# Patient Record
Sex: FEMALE | Race: BLACK OR AFRICAN AMERICAN | Employment: UNEMPLOYED | URBAN - METROPOLITAN AREA
[De-identification: names, ages, dates, MRNs, and addresses within clinical notes are randomized per-mention and may not be internally consistent; named-entity substitution may affect disease eponyms.]

---

## 2017-07-22 ENCOUNTER — APPOINTMENT (OUTPATIENT)
Dept: OCCUPATIONAL MEDICINE | Age: 37
End: 2017-07-22
Attending: FAMILY MEDICINE

## 2018-07-18 ENCOUNTER — HOSPITAL ENCOUNTER (OUTPATIENT)
Age: 38
Discharge: HOME | End: 2018-07-18
Attending: EMERGENCY MEDICINE
Payer: COMMERCIAL

## 2018-07-18 VITALS
WEIGHT: 220 LBS | OXYGEN SATURATION: 99 % | RESPIRATION RATE: 16 BRPM | DIASTOLIC BLOOD PRESSURE: 74 MMHG | HEART RATE: 85 BPM | TEMPERATURE: 98 F | HEIGHT: 66.5 IN | BODY MASS INDEX: 34.94 KG/M2 | SYSTOLIC BLOOD PRESSURE: 112 MMHG

## 2018-07-18 DIAGNOSIS — M67.912 ROTATOR CUFF DISORDER, LEFT: ICD-10-CM

## 2018-07-18 DIAGNOSIS — M54.32 SCIATICA OF LEFT SIDE: Primary | ICD-10-CM

## 2018-07-18 LAB
POCT BILIRUBIN URINE: NEGATIVE
POCT BLOOD URINE: NEGATIVE
POCT GLUCOSE URINE: NEGATIVE MG/DL
POCT KETONE URINE: NEGATIVE MG/DL
POCT LEUKOCYTE ESTERASE URINE: NEGATIVE
POCT NITRITE URINE: NEGATIVE
POCT PH URINE: 6.5 (ref 5–8)
POCT PROTEIN URINE: NEGATIVE MG/DL
POCT SPECIFIC GRAVITY URINE: 1.03
POCT URINE PREGNANCY: NEGATIVE
POCT UROBILINOGEN URINE: 0.2 MG/DL

## 2018-07-18 PROCEDURE — 81025 URINE PREGNANCY TEST: CPT | Performed by: EMERGENCY MEDICINE

## 2018-07-18 PROCEDURE — 81002 URINALYSIS NONAUTO W/O SCOPE: CPT | Performed by: EMERGENCY MEDICINE

## 2018-07-18 PROCEDURE — 99204 OFFICE O/P NEW MOD 45 MIN: CPT

## 2018-07-18 PROCEDURE — 99213 OFFICE O/P EST LOW 20 MIN: CPT

## 2018-07-18 PROCEDURE — 99214 OFFICE O/P EST MOD 30 MIN: CPT

## 2018-07-18 RX ORDER — METHYLPREDNISOLONE 4 MG/1
TABLET ORAL
Qty: 1 PACKAGE | Refills: 0 | Status: SHIPPED | OUTPATIENT
Start: 2018-07-18 | End: 2018-07-26 | Stop reason: ALTCHOICE

## 2018-07-18 RX ORDER — HYDROCODONE BITARTRATE AND ACETAMINOPHEN 5; 325 MG/1; MG/1
1 TABLET ORAL EVERY 6 HOURS PRN
Qty: 8 TABLET | Refills: 0 | Status: SHIPPED | OUTPATIENT
Start: 2018-07-18 | End: 2018-07-26 | Stop reason: ALTCHOICE

## 2018-07-18 NOTE — ED INITIAL ASSESSMENT (HPI)
Back pain - started today. Described back is pinching buttock,    Took naproxen   At 12 noon . Denies any injury or lifting any heavy object. Pt has h/o back pain.

## 2018-07-18 NOTE — ED PROVIDER NOTES
Patient Seen in: [facility]    History   Patient presents with:  Back Pain (musculoskeletal)    Stated Complaint: Low Back/ Left knee/ankle, Left shoulder pain    HPI    This is a 50-year-old female with multiple complaints.   Pa appearing. SKIN: Normal, warm, and dry. HEENT:  Pupils equally round and reactive to light. Conjuctiva clear. Oropharynx is clear and moist.   Lungs: Clear to auscultation bilaterally with no rales, no retractions, and no wheezing.   HEART:  Regular rat visit in 1 day          Medications Prescribed:  Discharge Medication List as of 7/18/2018  5:54 PM    START taking these medications    methylPREDNISolone (MEDROL) 4 MG Oral Tablet Therapy Pack  Dosepack: take as directed, Normal, Disp-1 Package, R-0    H

## 2018-07-19 ENCOUNTER — OFFICE VISIT (OUTPATIENT)
Dept: INTERNAL MEDICINE CLINIC | Facility: CLINIC | Age: 38
End: 2018-07-19
Payer: COMMERCIAL

## 2018-07-19 ENCOUNTER — LAB ENCOUNTER (OUTPATIENT)
Dept: LAB | Age: 38
End: 2018-07-19
Attending: INTERNAL MEDICINE
Payer: COMMERCIAL

## 2018-07-19 VITALS
BODY MASS INDEX: 37.16 KG/M2 | TEMPERATURE: 99 F | HEIGHT: 66.5 IN | DIASTOLIC BLOOD PRESSURE: 62 MMHG | WEIGHT: 234 LBS | SYSTOLIC BLOOD PRESSURE: 104 MMHG | HEART RATE: 84 BPM

## 2018-07-19 DIAGNOSIS — Z13.0 SCREENING FOR BLOOD DISEASE: ICD-10-CM

## 2018-07-19 DIAGNOSIS — Z13.29 THYROID DISORDER SCREENING: ICD-10-CM

## 2018-07-19 DIAGNOSIS — M70.42 PREPATELLAR BURSITIS OF LEFT KNEE: ICD-10-CM

## 2018-07-19 DIAGNOSIS — M75.82 TENDINITIS OF LEFT ROTATOR CUFF: ICD-10-CM

## 2018-07-19 DIAGNOSIS — M62.838 MUSCLE SPASM: Primary | ICD-10-CM

## 2018-07-19 DIAGNOSIS — E55.9 VITAMIN D DEFICIENCY: ICD-10-CM

## 2018-07-19 DIAGNOSIS — Z13.228 SCREENING FOR METABOLIC DISORDER: ICD-10-CM

## 2018-07-19 DIAGNOSIS — Z13.220 SCREENING FOR LIPID DISORDERS: ICD-10-CM

## 2018-07-19 DIAGNOSIS — Z13.1 SCREENING FOR DIABETES MELLITUS: ICD-10-CM

## 2018-07-19 LAB
ALBUMIN SERPL-MCNC: 3.7 G/DL (ref 3.5–4.8)
ALBUMIN/GLOB SERPL: 0.9 {RATIO} (ref 1–2)
ALP LIVER SERPL-CCNC: 75 U/L (ref 37–98)
ALT SERPL-CCNC: 25 U/L (ref 14–54)
ANION GAP SERPL CALC-SCNC: 8 MMOL/L (ref 0–18)
AST SERPL-CCNC: 18 U/L (ref 15–41)
BASOPHILS # BLD AUTO: 0.03 X10(3) UL (ref 0–0.1)
BASOPHILS NFR BLD AUTO: 0.4 %
BILIRUB SERPL-MCNC: 0.4 MG/DL (ref 0.1–2)
BUN BLD-MCNC: 8 MG/DL (ref 8–20)
BUN/CREAT SERPL: 10.8 (ref 10–20)
CALCIUM BLD-MCNC: 9.6 MG/DL (ref 8.3–10.3)
CHLORIDE SERPL-SCNC: 105 MMOL/L (ref 101–111)
CHOLEST SMN-MCNC: 143 MG/DL (ref ?–200)
CO2 SERPL-SCNC: 28 MMOL/L (ref 22–32)
CREAT BLD-MCNC: 0.74 MG/DL (ref 0.55–1.02)
EOSINOPHIL # BLD AUTO: 0.05 X10(3) UL (ref 0–0.3)
EOSINOPHIL NFR BLD AUTO: 0.7 %
ERYTHROCYTE [DISTWIDTH] IN BLOOD BY AUTOMATED COUNT: 14.4 % (ref 11.5–16)
EST. AVERAGE GLUCOSE BLD GHB EST-MCNC: 117 MG/DL (ref 68–126)
GLOBULIN PLAS-MCNC: 4 G/DL (ref 2.5–3.7)
GLUCOSE BLD-MCNC: 82 MG/DL (ref 70–99)
HBA1C MFR BLD HPLC: 5.7 % (ref ?–5.7)
HCT VFR BLD AUTO: 37.7 % (ref 34–50)
HDLC SERPL-MCNC: 43 MG/DL (ref 45–?)
HDLC SERPL: 3.33 {RATIO} (ref ?–4.44)
HGB BLD-MCNC: 11.6 G/DL (ref 12–16)
IMMATURE GRANULOCYTE COUNT: 0.02 X10(3) UL (ref 0–1)
IMMATURE GRANULOCYTE RATIO %: 0.3 %
LDLC SERPL CALC-MCNC: 84 MG/DL (ref ?–130)
LYMPHOCYTES # BLD AUTO: 1.7 X10(3) UL (ref 0.9–4)
LYMPHOCYTES NFR BLD AUTO: 23.6 %
M PROTEIN MFR SERPL ELPH: 7.7 G/DL (ref 6.1–8.3)
MCH RBC QN AUTO: 25.5 PG (ref 27–33.2)
MCHC RBC AUTO-ENTMCNC: 30.8 G/DL (ref 31–37)
MCV RBC AUTO: 82.9 FL (ref 81–100)
MONOCYTES # BLD AUTO: 0.46 X10(3) UL (ref 0.1–1)
MONOCYTES NFR BLD AUTO: 6.4 %
NEUTROPHIL ABS PRELIM: 4.94 X10 (3) UL (ref 1.3–6.7)
NEUTROPHILS # BLD AUTO: 4.94 X10(3) UL (ref 1.3–6.7)
NEUTROPHILS NFR BLD AUTO: 68.6 %
NONHDLC SERPL-MCNC: 100 MG/DL (ref ?–130)
OSMOLALITY SERPL CALC.SUM OF ELEC: 289 MOSM/KG (ref 275–295)
PLATELET # BLD AUTO: 483 10(3)UL (ref 150–450)
POTASSIUM SERPL-SCNC: 4.2 MMOL/L (ref 3.6–5.1)
RBC # BLD AUTO: 4.55 X10(6)UL (ref 3.8–5.1)
RED CELL DISTRIBUTION WIDTH-SD: 43.2 FL (ref 35.1–46.3)
SODIUM SERPL-SCNC: 141 MMOL/L (ref 136–144)
TRIGL SERPL-MCNC: 79 MG/DL (ref ?–150)
TSI SER-ACNC: 0.66 MIU/ML (ref 0.35–5.5)
VIT D+METAB SERPL-MCNC: 8.4 NG/ML (ref 30–100)
VLDLC SERPL CALC-MCNC: 16 MG/DL (ref 5–40)
WBC # BLD AUTO: 7.2 X10(3) UL (ref 4–13)

## 2018-07-19 PROCEDURE — 82306 VITAMIN D 25 HYDROXY: CPT

## 2018-07-19 PROCEDURE — 80061 LIPID PANEL: CPT

## 2018-07-19 PROCEDURE — 83036 HEMOGLOBIN GLYCOSYLATED A1C: CPT

## 2018-07-19 PROCEDURE — 99204 OFFICE O/P NEW MOD 45 MIN: CPT | Performed by: INTERNAL MEDICINE

## 2018-07-19 PROCEDURE — 85025 COMPLETE CBC W/AUTO DIFF WBC: CPT

## 2018-07-19 PROCEDURE — 80053 COMPREHEN METABOLIC PANEL: CPT

## 2018-07-19 PROCEDURE — 84443 ASSAY THYROID STIM HORMONE: CPT

## 2018-07-19 RX ORDER — CYCLOBENZAPRINE HCL 10 MG
14 TABLET ORAL 2 TIMES DAILY PRN
Qty: 28 TABLET | Refills: 1 | Status: SHIPPED | OUTPATIENT
Start: 2018-07-19 | End: 2018-07-26

## 2018-07-19 NOTE — PROGRESS NOTES
Anais Odonnellry  7/7/1980    Patient presents with:  Pain: LG. Room 12.  Left lower back pain that started yesterday, left shoulder pain that she's had a while and its getting worse, left knee pain on and off for 8 months and left ankle pain for 8 m further evaluation of symptoms, the patient presented to the office today. Review of Systems   No f/c/chest pain or sob. No cough. No abd pain/n/v/d. No ha or dizziness. No numbness, tingling, or weakness. No other complaints today.       Current Outpati Toña Tan is a 41year old female who presents with diffuse myalgias and arthralgias.     1 - Left shoulder rotator cuff tendonopathy  2 - Left cervical muscle spasm  3 - Left lumbar paraspinal muscle spasm  4 - Left knee pre-patellar bursit

## 2018-07-23 ENCOUNTER — TELEPHONE (OUTPATIENT)
Dept: INTERNAL MEDICINE CLINIC | Facility: CLINIC | Age: 38
End: 2018-07-23

## 2018-07-23 DIAGNOSIS — M54.16 LUMBAR RADICULOPATHY: ICD-10-CM

## 2018-07-23 DIAGNOSIS — G95.9 LUMBAR MYELOPATHY (HCC): Primary | ICD-10-CM

## 2018-07-23 DIAGNOSIS — M54.12 CERVICAL RADICULOPATHY: ICD-10-CM

## 2018-07-23 RX ORDER — ERGOCALCIFEROL 1.25 MG/1
50000 CAPSULE ORAL WEEKLY
Qty: 12 CAPSULE | Refills: 0 | Status: SHIPPED | OUTPATIENT
Start: 2018-07-23 | End: 2018-09-21

## 2018-07-23 NOTE — TELEPHONE ENCOUNTER
Patient states she has finished the Medrol Dose Pack and Norco, neither one of the medications worked well for her, states pain would return after 1-2 hours when taking Norco, Flexoril BID is slightly helpful and is taking Ibuprofen, Flexoril makes her juliano

## 2018-07-23 NOTE — TELEPHONE ENCOUNTER
Pt was told to call us today to let us know how she is-she is not better-still in a lot of pain in her back and knee-she is done with steroid and pain meds-not at work due to pain-would like to speak to a nurse to see if she needs to come in here or go [facility]

## 2018-07-24 ENCOUNTER — TELEPHONE (OUTPATIENT)
Dept: INTERNAL MEDICINE CLINIC | Facility: CLINIC | Age: 38
End: 2018-07-24

## 2018-07-24 ENCOUNTER — HOSPITAL ENCOUNTER (OUTPATIENT)
Dept: GENERAL RADIOLOGY | Age: 38
Discharge: HOME | End: 2018-07-24
Attending: INTERNAL MEDICINE
Payer: COMMERCIAL

## 2018-07-24 ENCOUNTER — HOSPITAL ENCOUNTER (OUTPATIENT)
Dept: ULTRASOUND IMAGING | Age: 38
Discharge: HOME | End: 2018-07-24
Attending: INTERNAL MEDICINE
Payer: COMMERCIAL

## 2018-07-24 DIAGNOSIS — M54.16 LUMBAR RADICULOPATHY: ICD-10-CM

## 2018-07-24 DIAGNOSIS — M54.12 CERVICAL RADICULOPATHY: ICD-10-CM

## 2018-07-24 DIAGNOSIS — G95.9 LUMBAR MYELOPATHY (HCC): ICD-10-CM

## 2018-07-24 DIAGNOSIS — R19.00 PELVIC MASS: ICD-10-CM

## 2018-07-24 DIAGNOSIS — R19.00 PELVIC MASS: Primary | ICD-10-CM

## 2018-07-24 PROCEDURE — 76830 TRANSVAGINAL US NON-OB: CPT | Performed by: INTERNAL MEDICINE

## 2018-07-24 PROCEDURE — 72050 X-RAY EXAM NECK SPINE 4/5VWS: CPT | Performed by: INTERNAL MEDICINE

## 2018-07-24 PROCEDURE — 76856 US EXAM PELVIC COMPLETE: CPT | Performed by: INTERNAL MEDICINE

## 2018-07-24 PROCEDURE — 72110 X-RAY EXAM L-2 SPINE 4/>VWS: CPT | Performed by: INTERNAL MEDICINE

## 2018-07-24 NOTE — TELEPHONE ENCOUNTER
Per AD, instructed to call pt to review results. He has tried to reach pt, but unsuccessful. Called and spoke to pt. Reviewed with pt, she verbalized understanding.   Pt stated she would be calling to schedule the U/S.

## 2018-07-24 NOTE — TELEPHONE ENCOUNTER
To on call provider-TB  Spoke with Grant regarding STAT results for Xray lumbar spine and Cspine done today. C-spine Conclusion:   CONCLUSION:  There is reversal of the cervical spine. The foramina are normal.  There is no fracture or subluxation.   Cally Lozada

## 2018-07-26 ENCOUNTER — TELEPHONE (OUTPATIENT)
Dept: INTERNAL MEDICINE CLINIC | Facility: CLINIC | Age: 38
End: 2018-07-26

## 2018-07-26 ENCOUNTER — OFFICE VISIT (OUTPATIENT)
Dept: INTERNAL MEDICINE CLINIC | Facility: CLINIC | Age: 38
End: 2018-07-26
Payer: COMMERCIAL

## 2018-07-26 VITALS
HEIGHT: 66.5 IN | RESPIRATION RATE: 16 BRPM | WEIGHT: 236 LBS | SYSTOLIC BLOOD PRESSURE: 118 MMHG | DIASTOLIC BLOOD PRESSURE: 72 MMHG | BODY MASS INDEX: 37.48 KG/M2 | TEMPERATURE: 98 F | HEART RATE: 78 BPM

## 2018-07-26 DIAGNOSIS — M54.50 LOW BACK PAIN WITH RADIATION: Primary | ICD-10-CM

## 2018-07-26 PROCEDURE — 99213 OFFICE O/P EST LOW 20 MIN: CPT | Performed by: PHYSICIAN ASSISTANT

## 2018-07-26 RX ORDER — TRAMADOL HYDROCHLORIDE 50 MG/1
50 TABLET ORAL EVERY 6 HOURS PRN
Qty: 30 TABLET | Refills: 0 | Status: SHIPPED | OUTPATIENT
Start: 2018-07-26 | End: 2018-08-14

## 2018-07-26 RX ORDER — METHYLPREDNISOLONE 4 MG/1
TABLET ORAL
Qty: 1 KIT | Refills: 0 | Status: SHIPPED | OUTPATIENT
Start: 2018-07-26 | End: 2018-07-31 | Stop reason: ALTCHOICE

## 2018-07-26 NOTE — TELEPHONE ENCOUNTER
Spoke with pt stating still very uncomfortable with low back pain w/ sciatica. Norco given at hospital not helping. Completed medrol pack with no improvements. still very uncomfortable with movements, walking, sitting, laying down. No comfortable position.

## 2018-07-26 NOTE — PROGRESS NOTES
Patient presents with:  Back Pain: AB RM 2, Patient states having left side lower back pain that travels down the left leg   Shoulder Pain: Patient states having left shoulder pain X 10 days   Sleep Problem      HPI:  Pt presents with c/o continued L low b 5/5 in B LEs in all directions. Lt touch sensation intact in B LEs. Skin: Skin is warm and dry. No rash noted. No erythema. No pallor. A/P:    Low back pain with radiation  (primary encounter diagnosis) - Acute on chronic sxs.   No improvement wit

## 2018-07-26 NOTE — TELEPHONE ENCOUNTER
Patient is having some severe pain; lower back, down legs and in the buttock area. Patient is very uncomfortable. Patient can't stand or sit without pain. AD gave her medication last week which she has finished and still having a lot of pain.   Please ad

## 2018-07-31 ENCOUNTER — TELEPHONE (OUTPATIENT)
Dept: INTERNAL MEDICINE CLINIC | Facility: CLINIC | Age: 38
End: 2018-07-31

## 2018-07-31 ENCOUNTER — OFFICE VISIT (OUTPATIENT)
Dept: OBGYN CLINIC | Facility: CLINIC | Age: 38
End: 2018-07-31
Payer: COMMERCIAL

## 2018-07-31 VITALS
DIASTOLIC BLOOD PRESSURE: 64 MMHG | BODY MASS INDEX: 38.68 KG/M2 | SYSTOLIC BLOOD PRESSURE: 108 MMHG | HEIGHT: 65.5 IN | WEIGHT: 235 LBS | HEART RATE: 95 BPM

## 2018-07-31 DIAGNOSIS — D25.1 INTRAMURAL LEIOMYOMA OF UTERUS: Primary | ICD-10-CM

## 2018-07-31 DIAGNOSIS — M54.40 LOW BACK PAIN WITH SCIATICA, SCIATICA LATERALITY UNSPECIFIED, UNSPECIFIED BACK PAIN LATERALITY, UNSPECIFIED CHRONICITY: ICD-10-CM

## 2018-07-31 PROCEDURE — 99203 OFFICE O/P NEW LOW 30 MIN: CPT | Performed by: OBSTETRICS & GYNECOLOGY

## 2018-07-31 RX ORDER — DIAZEPAM 2 MG/1
2 TABLET ORAL ONCE
Qty: 3 TABLET | Refills: 0 | Status: SHIPPED | OUTPATIENT
Start: 2018-07-31 | End: 2018-07-31

## 2018-07-31 NOTE — TELEPHONE ENCOUNTER
Pt called to schedule the MRI that was ordered by AD.  She was given details about the MRI and she is claustrophobic and would like to know if we can give her medication or possibly put her to sleep to be able to do this test. Please advise

## 2018-07-31 NOTE — TELEPHONE ENCOUNTER
Spoke with patient stating would like something to help her get through MRI, she is not severe claustrophobic but will freak out if unable to move. Patient has not tried anything in the past and is not allergic to any medications.  She is aware may need a d

## 2018-07-31 NOTE — PROGRESS NOTES
GYN H&P     2018  1:42 PM    CC: Patient presents with: Other: uterine fibroids/ had u/  Back Pain      HPI: patient is a 41year old  here for Patient presents with:   Other: uterine fibroids/ had u/  Back Pain      Pt N/A  Number of children: N/A     Occupational History  None on file     Social History Main Topics   Smoking status: Never Smoker    Smokeless tobacco: Never Used    Alcohol use Yes  0.6 oz/week    1 Standard drinks or equivalent per week         Comment: And Endovag) ([id number])    Result Date: 7/24/2018  CONCLUSION:  1. Large uterine fibroid measuring 7.3 cm. 2.  Thickened endometrium measuring 13 mm. Correlation menstrual phase is recommended.  3.  Small amount of free pelvic fluid likely physiolo

## 2018-07-31 NOTE — TELEPHONE ENCOUNTER
Spoke with patient informed AD prescribed Valium to be taken twice, as needed prior to MRI. Patient informed to have a  as medication may cause drowsiness. Patient verbalized understanding and agreeable to POC.

## 2018-08-10 ENCOUNTER — TELEPHONE (OUTPATIENT)
Dept: INTERNAL MEDICINE CLINIC | Facility: CLINIC | Age: 38
End: 2018-08-10

## 2018-08-10 NOTE — TELEPHONE ENCOUNTER
Pt called requesting us to fax her 3 ov notes from 7/19/18, 7/26/18 and 8/14/18/Xray results and US results to Jennifer/Ester Feliciano at [phone] for her disability-told pt she needs to come in and sign a release form and then we can fax the info-pt

## 2018-08-14 ENCOUNTER — OFFICE VISIT (OUTPATIENT)
Dept: INTERNAL MEDICINE CLINIC | Facility: CLINIC | Age: 38
End: 2018-08-14
Payer: COMMERCIAL

## 2018-08-14 VITALS
TEMPERATURE: 99 F | RESPIRATION RATE: 16 BRPM | HEIGHT: 65.5 IN | DIASTOLIC BLOOD PRESSURE: 70 MMHG | WEIGHT: 236 LBS | SYSTOLIC BLOOD PRESSURE: 116 MMHG | HEART RATE: 72 BPM | BODY MASS INDEX: 38.85 KG/M2

## 2018-08-14 DIAGNOSIS — M54.50 LOW BACK PAIN WITH RADIATION: ICD-10-CM

## 2018-08-14 PROCEDURE — 99213 OFFICE O/P EST LOW 20 MIN: CPT | Performed by: INTERNAL MEDICINE

## 2018-08-14 RX ORDER — TRAMADOL HYDROCHLORIDE 50 MG/1
50 TABLET ORAL EVERY 6 HOURS PRN
Qty: 30 TABLET | Refills: 0 | Status: SHIPPED | OUTPATIENT
Start: 2018-08-14 | End: 2018-11-15

## 2018-08-14 NOTE — PROGRESS NOTES
Blanca Fore  7/7/1980    Patient presents with:  Back Pain: AB RM 12, Patient states having back, leg, knee, neck pain no change from last visit   Medication Request      SUBJECTIVE   Larry Garcia Rey Cooper is a 41year old female who presents weakness. No other complaints today. Current Outpatient Prescriptions:  TraMADol HCl 50 MG Oral Tab Take 1 tablet (50 mg total) by mouth every 6 (six) hours as needed for Pain.  Disp: 30 tablet Rfl: 0   ergocalciferol 12816 units Oral Cap Take 1 capsul stemming from the spine/nerve root  No focal deficits  Given the severity of the patient's symptoms it was arranged to have her MRI completed on 8/15. Further management pending MRI findings.     2 - Intramural leiomyoma of the uterus:  Patient to continue

## 2018-08-15 ENCOUNTER — HOSPITAL ENCOUNTER (OUTPATIENT)
Dept: MRI IMAGING | Age: 38
Discharge: HOME | End: 2018-08-15
Attending: PHYSICIAN ASSISTANT
Payer: COMMERCIAL

## 2018-08-15 DIAGNOSIS — M54.50 LOW BACK PAIN WITH RADIATION: ICD-10-CM

## 2018-08-15 PROCEDURE — 72148 MRI LUMBAR SPINE W/O DYE: CPT | Performed by: PHYSICIAN ASSISTANT

## 2018-08-15 NOTE — TELEPHONE ENCOUNTER
Pt came in on 8/14 to sign release form and I did fax the information on 8/15 to Jennifer/Ester-did receive confirmation that all 15 pages went thru

## 2018-08-16 NOTE — PROGRESS NOTES
Neurosurg referral (could see iMlton JOYA or Alec JOYA or other) given she has lumbar disc herniation that is causing some stenosis.   Could we call Neurosurg and see how quickly we can get her in as per AD she has been off work for 6 weeks due to

## 2018-08-21 ENCOUNTER — TELEPHONE (OUTPATIENT)
Dept: INTERNAL MEDICINE CLINIC | Facility: CLINIC | Age: 38
End: 2018-08-21

## 2018-08-23 ENCOUNTER — TELEPHONE (OUTPATIENT)
Dept: INTERNAL MEDICINE CLINIC | Facility: CLINIC | Age: 38
End: 2018-08-23

## 2018-08-23 RX ORDER — ONDANSETRON HYDROCHLORIDE 8 MG/1
8 TABLET, FILM COATED ORAL EVERY 8 HOURS PRN
Qty: 21 TABLET | Refills: 0 | Status: SHIPPED | OUTPATIENT
Start: 2018-08-23 | End: 2018-09-21

## 2018-08-23 NOTE — TELEPHONE ENCOUNTER
Called pt to inform, per CB, can try zofran for nausea. If needs different pain meds then will need OV- pt declined to schedule appt at this time. Pt verbalized understanding and agreed with POC.

## 2018-08-23 NOTE — TELEPHONE ENCOUNTER
Called pt stating no worsening sx. However, pt knees buckled and fell on back today increasing pain, spasm-like. Pain is becoming debilitating, preventing pt from walking, barely stand, unable to complete every day activities.  Pt takes rx TraMADol HCl 50 M

## 2018-08-23 NOTE — TELEPHONE ENCOUNTER
We can try zofran for nausea. If needs different pain meds then will need OV. I will send zofran to her local pharmacy.

## 2018-08-23 NOTE — TELEPHONE ENCOUNTER
Patient states that since her appointment(8/14) and MRI she is having more back pain. The tramadol helps, however patient gets very nauseas and is wondering if she could get something different for the pain. MRI showed a torn disc.   Please advise    Kanwal

## 2018-08-28 ENCOUNTER — TELEPHONE (OUTPATIENT)
Dept: INTERNAL MEDICINE CLINIC | Facility: CLINIC | Age: 38
End: 2018-08-28

## 2018-08-28 NOTE — TELEPHONE ENCOUNTER
Pt called stating she now needs the MRI results she just had done on 8/15 to be sent to Jennifer/Ester Feliciano at [phone] am going to scan her a medical release form to fill out and she will fax it back-once we receive it please print results of

## 2018-08-29 ENCOUNTER — TELEPHONE (OUTPATIENT)
Dept: INTERNAL MEDICINE CLINIC | Facility: CLINIC | Age: 38
End: 2018-08-29

## 2018-08-29 NOTE — TELEPHONE ENCOUNTER
Called pt stating previous form filled out by AD left \"return to work\" date blank and wants to speak to AD regarding this. Pt stating also needs this attending physician form filled out by the 8/30.  Asked what does this form entail, as we have been Dioni Clifford

## 2018-08-29 NOTE — TELEPHONE ENCOUNTER
Patient was looking over the paperwork that Dr Graham Han was filling out for her. Patient saw that there is a spot for a return to work date that has not been filled out and would like to know what Dr Graham Han was thinking about that?  Would like a phone call b

## 2018-08-29 NOTE — TELEPHONE ENCOUNTER
Pt called stated she will refax it today. There are 3 pages that she will be sending. The 3rd page is for Attending physician statement that is due on the 30th. She is working on a very tight schedule.  Please advise

## 2018-08-29 NOTE — TELEPHONE ENCOUNTER
We did receive the signed fax to release the MRI done on 8/15/18-faxed to Jovanna Rodriguez at [phone]-did receive confirmation that it went thru-sent to scanning

## 2018-08-31 NOTE — TELEPHONE ENCOUNTER
Thank you for message. I spoke with the patient today at 18:11. Please contact the neurosurgery office again to see if there is sooner availability. The patient understands that she will not receive a call until 9/4, given the holiday weekend.

## 2018-09-04 NOTE — TELEPHONE ENCOUNTER
Spoke with patient informed her I call Melonie Raphael office to try scheduling her for sooner OV then 9/10/18, with him or any of the other providers in his office, but unfortunately there is no availability.  Patient verbalized understanding and agreeab

## 2018-09-06 NOTE — TELEPHONE ENCOUNTER
Called pt to inform we did receive the signed fax to release the MRI done on 8/15/18-faxed to Elder Powell at [phone]- did receive confirmation that it went thru on 8/29/18-sent to scanning.  Pt verbalized understanding and agreed with POC

## 2018-09-10 ENCOUNTER — OFFICE VISIT (OUTPATIENT)
Dept: SURGERY | Facility: CLINIC | Age: 38
End: 2018-09-10
Payer: COMMERCIAL

## 2018-09-10 VITALS
HEIGHT: 66 IN | BODY MASS INDEX: 36.96 KG/M2 | DIASTOLIC BLOOD PRESSURE: 76 MMHG | WEIGHT: 230 LBS | SYSTOLIC BLOOD PRESSURE: 128 MMHG | HEART RATE: 72 BPM

## 2018-09-10 DIAGNOSIS — M54.12 CERVICAL MYELOPATHY WITH CERVICAL RADICULOPATHY (HCC): Primary | ICD-10-CM

## 2018-09-10 DIAGNOSIS — R26.9 NEUROLOGIC GAIT DYSFUNCTION: ICD-10-CM

## 2018-09-10 DIAGNOSIS — R29.898 WEAKNESS OF BOTH UPPER EXTREMITIES: ICD-10-CM

## 2018-09-10 DIAGNOSIS — R29.898 WEAKNESS OF BOTH LOWER EXTREMITIES: ICD-10-CM

## 2018-09-10 DIAGNOSIS — M51.26 LUMBAR DISC HERNIATION: ICD-10-CM

## 2018-09-10 DIAGNOSIS — G95.9 CERVICAL MYELOPATHY WITH CERVICAL RADICULOPATHY (HCC): Primary | ICD-10-CM

## 2018-09-10 PROCEDURE — 99214 OFFICE O/P EST MOD 30 MIN: CPT | Performed by: PHYSICIAN ASSISTANT

## 2018-09-10 RX ORDER — METHYLPREDNISOLONE 4 MG/1
TABLET ORAL
Qty: 1 PACKAGE | Refills: 0 | Status: SHIPPED | OUTPATIENT
Start: 2018-09-10 | End: 2018-09-21

## 2018-09-10 RX ORDER — HYDROCODONE BITARTRATE AND ACETAMINOPHEN 10; 325 MG/1; MG/1
1 TABLET ORAL EVERY 8 HOURS PRN
Qty: 60 TABLET | Refills: 0 | Status: SHIPPED | OUTPATIENT
Start: 2018-09-10 | End: 2018-10-30

## 2018-09-10 NOTE — PROGRESS NOTES
Location of Pain:  Pt states that she has neck pain. Pt states that she has left sided shoulder pain. Pt states that she has bilateral weakness in the bilateral hands. Pt states that she has middle back  And lower back pain.  Pt states that she has bilatera

## 2018-09-10 NOTE — PATIENT INSTRUCTIONS
Refill policies:    • Allow 2-3 business days for refills; controlled substances may take longer.   • Contact your pharmacy at least 5 days prior to running out of medication and have them send an electronic request or submit request through the “request re entire amount billed. Precertification and Prior Authorizations: If your physician has recommended that you have a procedure or additional testing performed.   [facility]) will contact your insurance carrier to obtain pre-certi traction, third Medrol Dosepak  3. Stop the tramadol Norco for pain  4. Continue off of work  5.   Follow-up next week to review the MRI and discuss treatment options    She does have lumbar disc herniation with spondylosis but her symptoms are worse at n

## 2018-09-10 NOTE — PROGRESS NOTES
[facility] Neurosurgery Consultation      HISTORY OF PRESENT ILLNESS:Julissa Davis is a 41year old RH female for spinal consultation. As a history of having trouble that started back in November December of last year.   It seemed to get to the point vertigo with tramadol. She denies biting her tongue trouble choking or swallowing she denies any facial numbness or tingling into the face or tongue.     PAST MEDICAL HISTORY:  Past Medical History:   Diagnosis Date   • Anemia    • Dysmenorrhea    • Fibroi difficulty elevating her left knee and thigh. With cervical traction she is able to move her knee without pain and able to lift her left knee up getting her foot off the floor.   Traction is released she has knee pain again she is unable to elevate her lef pain  4. Continue off of work  5.   Follow-up next week to review the MRI and discuss treatment options    She does have lumbar disc herniation with spondylosis but her symptoms are worse at night she does get weakness in the upper and lower extremities an

## 2018-09-13 ENCOUNTER — TELEPHONE (OUTPATIENT)
Dept: SURGERY | Facility: CLINIC | Age: 38
End: 2018-09-13

## 2018-09-13 NOTE — TELEPHONE ENCOUNTER
Patient has MRI scheduled and needs something to relax her. Patient would like a call first before prescribing something for her.

## 2018-09-18 ENCOUNTER — HOSPITAL ENCOUNTER (OUTPATIENT)
Dept: MRI IMAGING | Age: 38
Discharge: HOME | End: 2018-09-18
Attending: PHYSICIAN ASSISTANT
Payer: COMMERCIAL

## 2018-09-18 ENCOUNTER — OFFICE VISIT (OUTPATIENT)
Dept: INTERNAL MEDICINE CLINIC | Facility: CLINIC | Age: 38
End: 2018-09-18
Payer: COMMERCIAL

## 2018-09-18 VITALS
DIASTOLIC BLOOD PRESSURE: 60 MMHG | RESPIRATION RATE: 14 BRPM | SYSTOLIC BLOOD PRESSURE: 98 MMHG | HEIGHT: 66 IN | TEMPERATURE: 97 F | BODY MASS INDEX: 37.61 KG/M2 | HEART RATE: 62 BPM | WEIGHT: 234 LBS

## 2018-09-18 DIAGNOSIS — R29.898 WEAKNESS OF BOTH LOWER EXTREMITIES: ICD-10-CM

## 2018-09-18 DIAGNOSIS — M54.16 LUMBAR RADICULOPATHY: ICD-10-CM

## 2018-09-18 DIAGNOSIS — G95.9 CERVICAL MYELOPATHY WITH CERVICAL RADICULOPATHY (HCC): Primary | ICD-10-CM

## 2018-09-18 DIAGNOSIS — R26.9 NEUROLOGIC GAIT DYSFUNCTION: ICD-10-CM

## 2018-09-18 DIAGNOSIS — M54.12 CERVICAL MYELOPATHY WITH CERVICAL RADICULOPATHY (HCC): Primary | ICD-10-CM

## 2018-09-18 DIAGNOSIS — G95.9 CERVICAL MYELOPATHY WITH CERVICAL RADICULOPATHY (HCC): ICD-10-CM

## 2018-09-18 DIAGNOSIS — R29.898 WEAKNESS OF BOTH UPPER EXTREMITIES: ICD-10-CM

## 2018-09-18 DIAGNOSIS — M54.12 CERVICAL MYELOPATHY WITH CERVICAL RADICULOPATHY (HCC): ICD-10-CM

## 2018-09-18 PROCEDURE — 72141 MRI NECK SPINE W/O DYE: CPT | Performed by: PHYSICIAN ASSISTANT

## 2018-09-18 PROCEDURE — 99213 OFFICE O/P EST LOW 20 MIN: CPT | Performed by: INTERNAL MEDICINE

## 2018-09-19 NOTE — PROGRESS NOTES
Veda Leonardo  7/7/1980    Patient presents with:   Follow - Up: AB RM 14, patient states having back spasms, unable to sleep, left knee pain, 8/10 pain scale      SUBJECTIVE   Dilia Stanley Mer is a 41year old female who presents as a foll TraMADol HCl 50 MG Oral Tab Take 1 tablet (50 mg total) by mouth every 6 (six) hours as needed for Pain.  Disp: 30 tablet Rfl: 0      No Known Allergies   Past Medical History:   Diagnosis Date   • Anemia    • Dysmenorrhea    • Fibroids    • H/O low back to oral steroid therapy  MRI C-spine (today): small posterior osteophyte without spinal canal or neural foraminal stenosis involving the C3/C4 and C4/C5 disc levels with no other acute abnormal findings  The patient is to proceed with the schedule neurosur

## 2018-09-21 ENCOUNTER — TELEPHONE (OUTPATIENT)
Dept: SURGERY | Facility: CLINIC | Age: 38
End: 2018-09-21

## 2018-09-21 ENCOUNTER — OFFICE VISIT (OUTPATIENT)
Dept: SURGERY | Facility: CLINIC | Age: 38
End: 2018-09-21
Payer: COMMERCIAL

## 2018-09-21 VITALS — HEART RATE: 80 BPM | DIASTOLIC BLOOD PRESSURE: 70 MMHG | RESPIRATION RATE: 18 BRPM | SYSTOLIC BLOOD PRESSURE: 118 MMHG

## 2018-09-21 DIAGNOSIS — G95.9 CERVICAL MYELOPATHY WITH CERVICAL RADICULOPATHY (HCC): Primary | ICD-10-CM

## 2018-09-21 DIAGNOSIS — R29.898 WEAKNESS OF BOTH UPPER EXTREMITIES: ICD-10-CM

## 2018-09-21 DIAGNOSIS — R26.9 NEUROLOGIC GAIT DYSFUNCTION: ICD-10-CM

## 2018-09-21 DIAGNOSIS — M51.26 LUMBAR DISC HERNIATION: ICD-10-CM

## 2018-09-21 DIAGNOSIS — R29.898 WEAKNESS OF BOTH LOWER EXTREMITIES: ICD-10-CM

## 2018-09-21 DIAGNOSIS — M54.12 CERVICAL MYELOPATHY WITH CERVICAL RADICULOPATHY (HCC): Primary | ICD-10-CM

## 2018-09-21 PROCEDURE — 99214 OFFICE O/P EST MOD 30 MIN: CPT | Performed by: PHYSICIAN ASSISTANT

## 2018-09-21 NOTE — PROGRESS NOTES
[facility] Neurosurgery follow-up      HISTORY OF PRESENT ILLNESS:Julissa STRICKLAND Misti Zabala is a 41year old RH female returns after imaging of her cervical spine. States overall she is about the same. The Norco has helped moderate her pain.     Last history 9/1 She gets relief with standing regarding her back and neck however when she stands she gets increasing pain in her left knee. She has difficulty ambulating because of the knee pain. She denies any dizziness but she did have some vertigo with tramadol. SPINE: Neck pain on flexion and extension. Gait intact but antalgic for the left knee. Right foot 3-4 beats of clonus. Negative Spurling's. Mild Mondragon's. Negative straight leg raise. She has focal left knee pain with manipulation.   She has difficu MRI of the lumbar spine show spondylosis at L2-3 with mild to moderate acquired central stenosis.   Moderate spondylosis at L5-S1 with a central right-sided disc herniation causing bilateral foraminal stenosis      ASSESSMENT:  1.  L2-3 spondylosis with kelby

## 2018-09-25 NOTE — TELEPHONE ENCOUNTER
Requesting a reasonable accommodation submitted patient's to be off of work starting July 18, 2018 until estimated return to work October 2018 currently unable to work she may need accommodation when she has returned to work

## 2018-10-04 ENCOUNTER — TELEPHONE (OUTPATIENT)
Dept: SURGERY | Facility: CLINIC | Age: 38
End: 2018-10-04

## 2018-10-04 NOTE — TELEPHONE ENCOUNTER
Pt called stated that she was either going to fax or bring in forms to be completed. Wanted to pay $25 form fee ahead of time. Pd, receipt in PSR bin until forms are rcvd.

## 2018-10-30 ENCOUNTER — TELEPHONE (OUTPATIENT)
Dept: SURGERY | Facility: CLINIC | Age: 38
End: 2018-10-30

## 2018-10-30 ENCOUNTER — OFFICE VISIT (OUTPATIENT)
Dept: SURGERY | Facility: CLINIC | Age: 38
End: 2018-10-30
Payer: COMMERCIAL

## 2018-10-30 VITALS — HEART RATE: 78 BPM | SYSTOLIC BLOOD PRESSURE: 140 MMHG | DIASTOLIC BLOOD PRESSURE: 84 MMHG

## 2018-10-30 DIAGNOSIS — G95.9 CERVICAL MYELOPATHY WITH CERVICAL RADICULOPATHY (HCC): Primary | ICD-10-CM

## 2018-10-30 DIAGNOSIS — R29.898 WEAKNESS OF BOTH UPPER EXTREMITIES: ICD-10-CM

## 2018-10-30 DIAGNOSIS — M51.26 LUMBAR DISC HERNIATION: ICD-10-CM

## 2018-10-30 DIAGNOSIS — R29.898 WEAKNESS OF BOTH LOWER EXTREMITIES: ICD-10-CM

## 2018-10-30 DIAGNOSIS — R26.9 NEUROLOGIC GAIT DYSFUNCTION: ICD-10-CM

## 2018-10-30 DIAGNOSIS — M54.12 CERVICAL MYELOPATHY WITH CERVICAL RADICULOPATHY (HCC): Primary | ICD-10-CM

## 2018-10-30 PROCEDURE — 99213 OFFICE O/P EST LOW 20 MIN: CPT | Performed by: PHYSICIAN ASSISTANT

## 2018-10-30 RX ORDER — HYDROCODONE BITARTRATE AND ACETAMINOPHEN 10; 325 MG/1; MG/1
1 TABLET ORAL EVERY 8 HOURS PRN
Qty: 90 TABLET | Refills: 0 | Status: SHIPPED | OUTPATIENT
Start: 2018-10-30 | End: 2018-12-11

## 2018-10-30 NOTE — PROGRESS NOTES
[facility] Neurosurgery follow-up      HISTORY OF PRESENT ILLNESS:Julissasamantha Soto is a 41year old RH female returns in follow-up. She continues to have neck pain which is a 4 to an 8/10. She is taking Norco 2-3 a day.   She continues to have bilateral She has had Norco for pain which gave her some relief she has had tramadol which causes nausea. She has had 2 Medrol Dosepaks. X-rays of her cervical lumbar spine and MRI of her lumbar spine.     She gets relief with standing regarding her back and neck NEUROLOGICAL:  This patient is alert and orientated x 3. Speech fluent. Comprehension intact. Face is symmetrical.  Radial nerves II through XII grossly intact    SPINE: Neck pain on flexion and extension. Gait intact. Right foot 3-4 beats of clonus. 3.  Cervical spondylosis C4–5 with myelopathy  4. Weakness in the upper and lower extremities  5. Neurological gait dysfunction    PLAN:  1. Follow-up in 6-8 weeks  2. Home cervical traction, try a Vista collar  3.   Discontinue physical therapy for cer

## 2018-10-30 NOTE — PROGRESS NOTES
Pt is here for follow up for cervical traction, PT for the cervical and lumbar. Pt states that she is still in PT. Pt states that she feels like the PT is working. Pt states that she is doing cervical traction.  Pt states that the cervical traction does he

## 2018-10-30 NOTE — PATIENT INSTRUCTIONS
Refill policies:    • Allow 2-3 business days for refills; controlled substances may take longer.   • Contact your pharmacy at least 5 days prior to running out of medication and have them send an electronic request or submit request through the “request re entire amount billed. Precertification and Prior Authorizations: If your physician has recommended that you have a procedure or additional testing performed.   [facility]) will contact your insurance carrier to obtain pre-certi

## 2018-11-01 ENCOUNTER — TELEPHONE (OUTPATIENT)
Dept: SURGERY | Facility: CLINIC | Age: 38
End: 2018-11-01

## 2018-11-01 NOTE — TELEPHONE ENCOUNTER
Received MR request for disability for all records from 8/16/18 to present. Sent to [employer]. Copy sent to scanning.

## 2018-11-02 ENCOUNTER — TELEPHONE (OUTPATIENT)
Dept: SURGERY | Facility: CLINIC | Age: 38
End: 2018-11-02

## 2018-11-02 NOTE — TELEPHONE ENCOUNTER
Returned patient's call    Pt states she has an extremely long commute and needs to stop multiple times in order to make it to work. She was taking prescribed norco but can not drive or work while on this medication.       She is scheduled to have injectio

## 2018-11-02 NOTE — TELEPHONE ENCOUNTER
Was called. She tried driving to work and had a lot of pain. She works on productivity she is worried about trying to get to work and then trying to make her productivity.   sHe is given a note to be off work until we see her back

## 2018-11-09 ENCOUNTER — TELEPHONE (OUTPATIENT)
Dept: SURGERY | Facility: CLINIC | Age: 38
End: 2018-11-09
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
## 2018-11-09 NOTE — TELEPHONE ENCOUNTER
Called pt about message from front staff. Pain currently making it difficult for her to walk. Wants Austen to give her sooner appointment with Dr. Linda Hayward as she was not able to make it to her appt today due to traffic.     Informed her our office can no

## 2018-11-15 ENCOUNTER — TELEPHONE (OUTPATIENT)
Dept: PAIN CLINIC | Facility: CLINIC | Age: 38
End: 2018-11-15

## 2018-11-15 ENCOUNTER — OFFICE VISIT (OUTPATIENT)
Dept: PAIN CLINIC | Facility: CLINIC | Age: 38
End: 2018-11-15
Payer: COMMERCIAL

## 2018-11-15 VITALS
DIASTOLIC BLOOD PRESSURE: 76 MMHG | WEIGHT: 230 LBS | BODY MASS INDEX: 36.96 KG/M2 | HEIGHT: 66 IN | OXYGEN SATURATION: 97 % | HEART RATE: 84 BPM | SYSTOLIC BLOOD PRESSURE: 132 MMHG

## 2018-11-15 DIAGNOSIS — M54.16 LUMBAR RADICULOPATHY: Primary | ICD-10-CM

## 2018-11-15 DIAGNOSIS — M51.36 DDD (DEGENERATIVE DISC DISEASE), LUMBAR: ICD-10-CM

## 2018-11-15 PROCEDURE — 99203 OFFICE O/P NEW LOW 30 MIN: CPT | Performed by: ANESTHESIOLOGY

## 2018-11-15 NOTE — TELEPHONE ENCOUNTER
Medical clearance needed- no    Pt seen in OV today by Dr. Melchor Mcpherson and recommended for TLESI (X 3). Please begin PA process for procedure(s).      Laterality: bilateral  Level(s): L5-S1    Pt informed callback will be given when PA feedback received and proce

## 2018-11-15 NOTE — PATIENT INSTRUCTIONS
Refill policies:    • Allow 2-3 business days for refills; controlled substances may take longer.   • Contact your pharmacy at least 5 days prior to running out of medication and have them send an electronic request or submit request through the “request re entire amount billed. Precertification and Prior Authorizations: If your physician has recommended that you have a procedure or additional testing performed.   [facility]) will contact your insurance carrier to obtain pre-certi infection such as cough, fever, chills, urinary symptoms, or have recently been prescribed antibiotics, have open wounds, have recently had surgery or dental procedures.      As you will be unable to shower for 24 hours after your procedure, we ask  that yo (Rivaroxaban) 3 days  • Lovenox (Enoxaparin) 24 hours  • Aspirin  • 81mg 24 hours  • Greater than 81 mg (325mg) 7 days  • Coumadin       5 days  • Procedure may be cancelled if INR is elevated.    • Excedrin (with aspirin) 7 days  • Plavix (Clopidogrel)  • [facility] PRE-PROCEDURE LINE -[phone] FOR DETAILED INSTRUCTIONS FIVE TO SEVEN DAYS PRIOR TO PROCEDURE**

## 2018-11-15 NOTE — H&P
Name: Breanne Villalobos   : 1980   DOS: 11/15/2018     Chief complaint: Low back pain    History of present illness:  Breanne Villalobos is a 41year old female referred by neurosurgery for evaluation of chronic low back and upper extremi female not in acute distress  /76 (BP Location: Right arm, Patient Position: Sitting, Cuff Size: adult)   Pulse 84   Ht 66\"   Wt 230 lb   SpO2 97%   BMI 37.12 kg/m²    The patient is awake, alert, oriented and corporative. She has a normal affect.  Wild Sullivan transforaminal epidural steroid injection. The benefits of the injections were discussed in detail. All questions addressed the patient satisfaction. She would like to trial this treatment course.   She can follow-up after transforaminal epidural steroid

## 2018-11-16 ENCOUNTER — TELEPHONE (OUTPATIENT)
Dept: SURGERY | Facility: CLINIC | Age: 38
End: 2018-11-16

## 2018-11-16 NOTE — TELEPHONE ENCOUNTER
MR gresham from [facility] for notes, op reports, hospital admissions, testing etc 8/16/18 to present. Sent to [employer]. Copy sent to scanning.

## 2018-11-16 NOTE — TELEPHONE ENCOUNTER
Patient is scheduled for:    11/19/18 at 1415  11/28/18 at 1430  12/5/18 at 1345. Pre-procedure instructions reviewed with patient. Patient verbalized understanding.

## 2018-11-16 NOTE — TELEPHONE ENCOUNTER
[facility] [place][note 2] letter for disability for times to discuss patient OTP from [address] [place] [address] Drive.  Endorsed to [employer]

## 2018-11-16 NOTE — TELEPHONE ENCOUNTER
Spoke with Daquan Ying from [facility] he states no PA needed for [facility] for this patients plan or any outpatient setting procedure.   Call reference number [id number]  Call time 10:52    Patient can now be scheduled out

## 2018-11-19 ENCOUNTER — HOSPITAL ENCOUNTER (OUTPATIENT)
Facility: HOSPITAL | Age: 38
Setting detail: HOSPITAL OUTPATIENT SURGERY
Discharge: HOME | End: 2018-11-19
Attending: ANESTHESIOLOGY | Admitting: ANESTHESIOLOGY
Payer: COMMERCIAL

## 2018-11-19 ENCOUNTER — APPOINTMENT (OUTPATIENT)
Dept: GENERAL RADIOLOGY | Facility: HOSPITAL | Age: 38
End: 2018-11-19
Attending: ANESTHESIOLOGY
Payer: COMMERCIAL

## 2018-11-19 VITALS
DIASTOLIC BLOOD PRESSURE: 60 MMHG | RESPIRATION RATE: 15 BRPM | OXYGEN SATURATION: 100 % | TEMPERATURE: 98 F | SYSTOLIC BLOOD PRESSURE: 90 MMHG | HEART RATE: 64 BPM

## 2018-11-19 DIAGNOSIS — M51.36 DDD (DEGENERATIVE DISC DISEASE), LUMBAR: ICD-10-CM

## 2018-11-19 DIAGNOSIS — M54.16 LUMBAR RADICULOPATHY: ICD-10-CM

## 2018-11-19 PROCEDURE — 3E0R33Z INTRODUCTION OF ANTI-INFLAMMATORY INTO SPINAL CANAL, PERCUTANEOUS APPROACH: ICD-10-PCS | Performed by: ANESTHESIOLOGY

## 2018-11-19 PROCEDURE — 99152 MOD SED SAME PHYS/QHP 5/>YRS: CPT | Performed by: ANESTHESIOLOGY

## 2018-11-19 PROCEDURE — 81025 URINE PREGNANCY TEST: CPT | Performed by: ANESTHESIOLOGY

## 2018-11-19 RX ORDER — DEXAMETHASONE SODIUM PHOSPHATE 10 MG/ML
INJECTION, SOLUTION INTRAMUSCULAR; INTRAVENOUS AS NEEDED
Status: DISCONTINUED | OUTPATIENT
Start: 2018-11-19 | End: 2018-11-19 | Stop reason: HOSPADM

## 2018-11-19 RX ORDER — DIPHENHYDRAMINE HYDROCHLORIDE 50 MG/ML
50 INJECTION INTRAMUSCULAR; INTRAVENOUS ONCE AS NEEDED
Status: DISCONTINUED | OUTPATIENT
Start: 2018-11-19 | End: 2018-11-19

## 2018-11-19 RX ORDER — ONDANSETRON 2 MG/ML
4 INJECTION INTRAMUSCULAR; INTRAVENOUS ONCE AS NEEDED
Status: DISCONTINUED | OUTPATIENT
Start: 2018-11-19 | End: 2018-11-19

## 2018-11-19 RX ORDER — MIDAZOLAM HYDROCHLORIDE 1 MG/ML
INJECTION INTRAMUSCULAR; INTRAVENOUS AS NEEDED
Status: DISCONTINUED | OUTPATIENT
Start: 2018-11-19 | End: 2018-11-19 | Stop reason: HOSPADM

## 2018-11-19 RX ORDER — ONDANSETRON 2 MG/ML
4 INJECTION INTRAMUSCULAR; INTRAVENOUS ONCE AS NEEDED
Status: DISCONTINUED | OUTPATIENT
Start: 2018-11-19 | End: 2018-11-19 | Stop reason: HOSPADM

## 2018-11-19 RX ORDER — LIDOCAINE HYDROCHLORIDE 10 MG/ML
INJECTION, SOLUTION EPIDURAL; INFILTRATION; INTRACAUDAL; PERINEURAL AS NEEDED
Status: DISCONTINUED | OUTPATIENT
Start: 2018-11-19 | End: 2018-11-19 | Stop reason: HOSPADM

## 2018-11-19 RX ORDER — 0.9 % SODIUM CHLORIDE 0.9 %
VIAL (ML) INJECTION AS NEEDED
Status: DISCONTINUED | OUTPATIENT
Start: 2018-11-19 | End: 2018-11-19 | Stop reason: HOSPADM

## 2018-11-19 RX ORDER — SODIUM CHLORIDE, SODIUM LACTATE, POTASSIUM CHLORIDE, CALCIUM CHLORIDE 600; 310; 30; 20 MG/100ML; MG/100ML; MG/100ML; MG/100ML
100 INJECTION, SOLUTION INTRAVENOUS CONTINUOUS
Status: DISCONTINUED | OUTPATIENT
Start: 2018-11-19 | End: 2018-11-19

## 2018-11-19 NOTE — OR NURSING
Dr. Matias Primrose made aware patient took a sip of juice at 1100. Per Dr. Matias Primrose, ok to proceed, no new orders.

## 2018-11-19 NOTE — H&P
History & Physical Examination    Patient Name: Robert Mitchell  MRN: [id number]  [facility]: [id number]  YOB: 1980    Pre-Operative Diagnosis:  DDD (degenerative disc disease), lumbar [M51.36]  Lumbar radiculopathy [M54.16]    Present Illness understand and agree to proceed with plan of care. [ x ] I have reviewed the History and Physical done within the last 30 days. Any changes noted above.     Jessi Cho MD

## 2018-11-19 NOTE — OPERATIVE REPORT
[facility]  Operative Report  2018     Melanie Porter Patient Status:  Hospital Outpatient Surgery    1980 MRN [id number]   [facility] SURGERY Attending Gail Olivo MD   Hosp Day # 0 PCP MD Adriana Moreno right L5 level atraumatically under fluoroscopic guidance. The needle was advanced into the anterior epidural space at this level. The needle position was confirmed under AP and lateral fluoroscopic view.   Following negative aspiration for CSF and blood, a

## 2018-11-20 ENCOUNTER — TELEPHONE (OUTPATIENT)
Dept: SURGERY | Facility: CLINIC | Age: 38
End: 2018-11-20

## 2018-11-20 NOTE — TELEPHONE ENCOUNTER
Will rout to provider per pt request:    Per TE 11/1/18 and  11/16/2018   Request for records sent to medical records. Per TE 11/1/18 our office also faxed records to Wes([phone])  They were also printed for pickup by Andre Reilly.  Per Pt's re

## 2018-11-20 NOTE — TELEPHONE ENCOUNTER
Pt requesting that PA contact Dr. Melva Calderon at [facility] [phone] to give current condition and possibly send office notes.

## 2018-11-28 ENCOUNTER — HOSPITAL ENCOUNTER (OUTPATIENT)
Facility: HOSPITAL | Age: 38
Setting detail: HOSPITAL OUTPATIENT SURGERY
Discharge: HOME | End: 2018-11-28
Attending: ANESTHESIOLOGY | Admitting: ANESTHESIOLOGY
Payer: COMMERCIAL

## 2018-11-28 ENCOUNTER — APPOINTMENT (OUTPATIENT)
Dept: GENERAL RADIOLOGY | Facility: HOSPITAL | Age: 38
End: 2018-11-28
Attending: ANESTHESIOLOGY
Payer: COMMERCIAL

## 2018-11-28 VITALS
RESPIRATION RATE: 16 BRPM | HEART RATE: 72 BPM | TEMPERATURE: 99 F | OXYGEN SATURATION: 100 % | SYSTOLIC BLOOD PRESSURE: 109 MMHG | DIASTOLIC BLOOD PRESSURE: 61 MMHG

## 2018-11-28 DIAGNOSIS — M54.16 LUMBAR RADICULOPATHY: ICD-10-CM

## 2018-11-28 DIAGNOSIS — M51.36 DDD (DEGENERATIVE DISC DISEASE), LUMBAR: ICD-10-CM

## 2018-11-28 PROCEDURE — 3E0R33Z INTRODUCTION OF ANTI-INFLAMMATORY INTO SPINAL CANAL, PERCUTANEOUS APPROACH: ICD-10-PCS | Performed by: ANESTHESIOLOGY

## 2018-11-28 PROCEDURE — 99152 MOD SED SAME PHYS/QHP 5/>YRS: CPT | Performed by: ANESTHESIOLOGY

## 2018-11-28 PROCEDURE — 81025 URINE PREGNANCY TEST: CPT | Performed by: ANESTHESIOLOGY

## 2018-11-28 RX ORDER — LIDOCAINE HYDROCHLORIDE 10 MG/ML
INJECTION, SOLUTION EPIDURAL; INFILTRATION; INTRACAUDAL; PERINEURAL AS NEEDED
Status: DISCONTINUED | OUTPATIENT
Start: 2018-11-28 | End: 2018-11-28 | Stop reason: HOSPADM

## 2018-11-28 RX ORDER — SODIUM CHLORIDE, SODIUM LACTATE, POTASSIUM CHLORIDE, CALCIUM CHLORIDE 600; 310; 30; 20 MG/100ML; MG/100ML; MG/100ML; MG/100ML
100 INJECTION, SOLUTION INTRAVENOUS CONTINUOUS
Status: DISCONTINUED | OUTPATIENT
Start: 2018-11-28 | End: 2018-11-28

## 2018-11-28 RX ORDER — DEXAMETHASONE SODIUM PHOSPHATE 10 MG/ML
INJECTION, SOLUTION INTRAMUSCULAR; INTRAVENOUS AS NEEDED
Status: DISCONTINUED | OUTPATIENT
Start: 2018-11-28 | End: 2018-11-28 | Stop reason: HOSPADM

## 2018-11-28 RX ORDER — ONDANSETRON 2 MG/ML
4 INJECTION INTRAMUSCULAR; INTRAVENOUS ONCE AS NEEDED
Status: DISCONTINUED | OUTPATIENT
Start: 2018-11-28 | End: 2018-11-28 | Stop reason: HOSPADM

## 2018-11-28 RX ORDER — 0.9 % SODIUM CHLORIDE 0.9 %
VIAL (ML) INJECTION AS NEEDED
Status: DISCONTINUED | OUTPATIENT
Start: 2018-11-28 | End: 2018-11-28 | Stop reason: HOSPADM

## 2018-11-28 RX ORDER — ONDANSETRON 2 MG/ML
4 INJECTION INTRAMUSCULAR; INTRAVENOUS ONCE AS NEEDED
Status: DISCONTINUED | OUTPATIENT
Start: 2018-11-28 | End: 2018-11-28

## 2018-11-28 RX ORDER — DIPHENHYDRAMINE HYDROCHLORIDE 50 MG/ML
50 INJECTION INTRAMUSCULAR; INTRAVENOUS ONCE AS NEEDED
Status: DISCONTINUED | OUTPATIENT
Start: 2018-11-28 | End: 2018-11-28

## 2018-11-28 RX ORDER — MIDAZOLAM HYDROCHLORIDE 1 MG/ML
INJECTION INTRAMUSCULAR; INTRAVENOUS AS NEEDED
Status: DISCONTINUED | OUTPATIENT
Start: 2018-11-28 | End: 2018-11-28 | Stop reason: HOSPADM

## 2018-11-28 NOTE — OPERATIVE REPORT
[facility]  Operative Report  2018     Jenaro Mendiola Patient Status:  Hospital Outpatient Surgery    1980 MRN [id number]   [facility] SURGERY Attending Danial Benson MD   Hosp Day # 0 PCP MD Gian Brown pedicle of the right L5 level atraumatically under fluoroscopic guidance. The needle was advanced into the anterior epidural space at this level. The needle position was confirmed under AP and lateral fluoroscopic view.   This is repeated on the contralater

## 2018-11-28 NOTE — H&P
History & Physical Examination    Patient Name: Breanne Villalobos  MRN: [id number]  [facility]: [id number]  YOB: 1980    Pre-Operative Diagnosis:  Lumbar radiculopathy [M54.16]  DDD (degenerative disc disease), lumbar [M51.36]    Present Illness ]    EXTREMITIES [x ] [x ]    OTHER        [ x ] I have discussed the risks and benefits and alternatives with the patient/family. They understand and agree to proceed with plan of care.   [ x ] I have reviewed the History and Physical done within the last

## 2018-11-30 ENCOUNTER — TELEPHONE (OUTPATIENT)
Dept: SURGERY | Facility: CLINIC | Age: 38
End: 2018-11-30

## 2018-11-30 NOTE — TELEPHONE ENCOUNTER
Spoke to patient, confirmed procedure date of 12/05 and to be checked in at outpatient registration at 12:45 pm. Patient instructed to call pre-procedure line before procedure at [phone].  Patient instructed to call office if there are additional quest

## 2018-12-05 ENCOUNTER — APPOINTMENT (OUTPATIENT)
Dept: GENERAL RADIOLOGY | Facility: HOSPITAL | Age: 38
End: 2018-12-05
Attending: ANESTHESIOLOGY
Payer: COMMERCIAL

## 2018-12-05 ENCOUNTER — HOSPITAL ENCOUNTER (OUTPATIENT)
Facility: HOSPITAL | Age: 38
Setting detail: HOSPITAL OUTPATIENT SURGERY
Discharge: HOME | End: 2018-12-05
Attending: ANESTHESIOLOGY | Admitting: ANESTHESIOLOGY
Payer: COMMERCIAL

## 2018-12-05 VITALS
OXYGEN SATURATION: 100 % | RESPIRATION RATE: 14 BRPM | HEART RATE: 67 BPM | DIASTOLIC BLOOD PRESSURE: 65 MMHG | TEMPERATURE: 99 F | SYSTOLIC BLOOD PRESSURE: 105 MMHG

## 2018-12-05 DIAGNOSIS — M54.16 LUMBAR RADICULOPATHY: ICD-10-CM

## 2018-12-05 DIAGNOSIS — M51.36 DDD (DEGENERATIVE DISC DISEASE), LUMBAR: ICD-10-CM

## 2018-12-05 PROCEDURE — 3E0R33Z INTRODUCTION OF ANTI-INFLAMMATORY INTO SPINAL CANAL, PERCUTANEOUS APPROACH: ICD-10-PCS | Performed by: ANESTHESIOLOGY

## 2018-12-05 PROCEDURE — 81025 URINE PREGNANCY TEST: CPT | Performed by: ANESTHESIOLOGY

## 2018-12-05 PROCEDURE — 99152 MOD SED SAME PHYS/QHP 5/>YRS: CPT | Performed by: ANESTHESIOLOGY

## 2018-12-05 RX ORDER — DIPHENHYDRAMINE HYDROCHLORIDE 50 MG/ML
50 INJECTION INTRAMUSCULAR; INTRAVENOUS ONCE AS NEEDED
Status: DISCONTINUED | OUTPATIENT
Start: 2018-12-05 | End: 2018-12-05

## 2018-12-05 RX ORDER — ONDANSETRON 2 MG/ML
4 INJECTION INTRAMUSCULAR; INTRAVENOUS ONCE AS NEEDED
Status: DISCONTINUED | OUTPATIENT
Start: 2018-12-05 | End: 2018-12-05 | Stop reason: HOSPADM

## 2018-12-05 RX ORDER — 0.9 % SODIUM CHLORIDE 0.9 %
VIAL (ML) INJECTION AS NEEDED
Status: DISCONTINUED | OUTPATIENT
Start: 2018-12-05 | End: 2018-12-05 | Stop reason: HOSPADM

## 2018-12-05 RX ORDER — MIDAZOLAM HYDROCHLORIDE 1 MG/ML
INJECTION INTRAMUSCULAR; INTRAVENOUS AS NEEDED
Status: DISCONTINUED | OUTPATIENT
Start: 2018-12-05 | End: 2018-12-05 | Stop reason: HOSPADM

## 2018-12-05 RX ORDER — ONDANSETRON 2 MG/ML
4 INJECTION INTRAMUSCULAR; INTRAVENOUS ONCE AS NEEDED
Status: DISCONTINUED | OUTPATIENT
Start: 2018-12-05 | End: 2018-12-05

## 2018-12-05 RX ORDER — LIDOCAINE HYDROCHLORIDE 10 MG/ML
INJECTION, SOLUTION EPIDURAL; INFILTRATION; INTRACAUDAL; PERINEURAL AS NEEDED
Status: DISCONTINUED | OUTPATIENT
Start: 2018-12-05 | End: 2018-12-05 | Stop reason: HOSPADM

## 2018-12-05 RX ORDER — DEXAMETHASONE SODIUM PHOSPHATE 10 MG/ML
INJECTION, SOLUTION INTRAMUSCULAR; INTRAVENOUS AS NEEDED
Status: DISCONTINUED | OUTPATIENT
Start: 2018-12-05 | End: 2018-12-05 | Stop reason: HOSPADM

## 2018-12-05 RX ORDER — SODIUM CHLORIDE, SODIUM LACTATE, POTASSIUM CHLORIDE, CALCIUM CHLORIDE 600; 310; 30; 20 MG/100ML; MG/100ML; MG/100ML; MG/100ML
100 INJECTION, SOLUTION INTRAVENOUS CONTINUOUS
Status: DISCONTINUED | OUTPATIENT
Start: 2018-12-05 | End: 2018-12-05

## 2018-12-05 NOTE — OPERATIVE REPORT
[facility]  Operative Report  2018     Gema Ghazal Patient Status:  Hospital Outpatient Surgery    1980 MRN [id number]   [facility] SURGERY Attending Michelle Thapa MD   Hosp Day # 0 PCP Melanie Harris MD     Indicatio process and pedicle of the right L5 level atraumatically under fluoroscopic guidance. The needle was advanced into the anterior epidural space at this level. The needle position was confirmed under AP and lateral fluoroscopic view.   This is repeated in the

## 2018-12-05 NOTE — H&P
History & Physical Examination    Patient Name: Masoud Everett  MRN: [id number]  [facility]: [id number]  YOB: 1980    Pre-Operative Diagnosis:  Lumbar radiculopathy [M54.16]  DDD (degenerative disc disease), lumbar [M51.36]    Present Illness [x ] [x ]    NECK & BACK [x ] [x ]    HEART [x ] [x ]    LUNGS [x ] [x ]    ABDOMEN [x ] [x ]    UROGENITAL [x ] [x ]    EXTREMITIES [x ] [x ]    OTHER        [ x ] I have discussed the risks and benefits and alternatives with the patient/family.   They und

## 2018-12-11 ENCOUNTER — OFFICE VISIT (OUTPATIENT)
Dept: SURGERY | Facility: CLINIC | Age: 38
End: 2018-12-11
Payer: COMMERCIAL

## 2018-12-11 DIAGNOSIS — G95.9 CERVICAL MYELOPATHY WITH CERVICAL RADICULOPATHY (HCC): Primary | ICD-10-CM

## 2018-12-11 DIAGNOSIS — R29.898 WEAKNESS OF BOTH UPPER EXTREMITIES: ICD-10-CM

## 2018-12-11 DIAGNOSIS — M51.26 LUMBAR DISC HERNIATION: ICD-10-CM

## 2018-12-11 DIAGNOSIS — M54.12 CERVICAL MYELOPATHY WITH CERVICAL RADICULOPATHY (HCC): Primary | ICD-10-CM

## 2018-12-11 DIAGNOSIS — R26.9 NEUROLOGIC GAIT DYSFUNCTION: ICD-10-CM

## 2018-12-11 DIAGNOSIS — R29.898 WEAKNESS OF BOTH LOWER EXTREMITIES: ICD-10-CM

## 2018-12-11 PROCEDURE — 99213 OFFICE O/P EST LOW 20 MIN: CPT | Performed by: PHYSICIAN ASSISTANT

## 2018-12-11 RX ORDER — HYDROCODONE BITARTRATE AND ACETAMINOPHEN 10; 325 MG/1; MG/1
1 TABLET ORAL EVERY 8 HOURS PRN
Qty: 90 TABLET | Refills: 0 | Status: SHIPPED | OUTPATIENT
Start: 2018-12-11 | End: 2019-01-22

## 2018-12-11 NOTE — PATIENT INSTRUCTIONS
Refill policies:    • Allow 2-3 business days for refills; controlled substances may take longer.   • Contact your pharmacy at least 5 days prior to running out of medication and have them send an electronic request or submit request through the “request re entire amount billed. Precertification and Prior Authorizations: If your physician has recommended that you have a procedure or additional testing performed.   [facility]) will contact your insurance carrier to obtain pre-certi offered to see a surgeon to discuss surgical intervention for her cervical spine she declined at this time.

## 2018-12-11 NOTE — PROGRESS NOTES
[facility] Neurosurgery follow-up      HISTORY OF PRESENT ILLNESS:Julissa Mitchell November is a 41year old RH female returns in follow-up. She had a bilateral L5-S1 transforaminal epidural steroid injection 12/5/18, 11/28/18, 11/19/18.     She denies any leg She gets thoracic and lumbar tightness and spasms which are worse laying flat or at nighttime. She has difficulty rolling over. She has stiffness in the morning when she gets out of bed which is the presenting symptoms she had back in November December. family history includes COPD in her mother; Cancer in her father, paternal aunt, paternal grandmother, and paternal uncle. SOCIAL HISTORY:   reports that  has never smoked.  she has never used smokeless tobacco. She reports that she drinks about 0.6 oz o Left      2+           2+       1+        3+       2+              IMAGING:  MRI of the cervical spine 9/18/18 shows loss of cervical lordosis slight kyphosis at C4-5.   She has loss of CSF reserve anteriorly at C3-4-5 slight flattening of the cord at C3-4-

## 2018-12-12 ENCOUNTER — TELEPHONE (OUTPATIENT)
Dept: SURGERY | Facility: CLINIC | Age: 38
End: 2018-12-12

## 2018-12-12 NOTE — TELEPHONE ENCOUNTER
vd req for leave extension from [place] to be completed. Patient had appt 12/11/18-NO CHARGE. Endorsed paperwk to [place] to be completed.   paperwk states need to be completed before 12/25/18

## 2018-12-13 ENCOUNTER — OFFICE VISIT (OUTPATIENT)
Dept: PAIN CLINIC | Facility: CLINIC | Age: 38
End: 2018-12-13
Payer: COMMERCIAL

## 2018-12-13 VITALS
OXYGEN SATURATION: 98 % | WEIGHT: 235 LBS | HEART RATE: 83 BPM | SYSTOLIC BLOOD PRESSURE: 116 MMHG | BODY MASS INDEX: 37.77 KG/M2 | DIASTOLIC BLOOD PRESSURE: 88 MMHG | HEIGHT: 66 IN

## 2018-12-13 DIAGNOSIS — M51.36 DDD (DEGENERATIVE DISC DISEASE), LUMBAR: ICD-10-CM

## 2018-12-13 DIAGNOSIS — M54.16 LUMBAR RADICULOPATHY: Primary | ICD-10-CM

## 2018-12-13 PROCEDURE — 99213 OFFICE O/P EST LOW 20 MIN: CPT | Performed by: ANESTHESIOLOGY

## 2018-12-13 RX ORDER — IBUPROFEN 800 MG/1
800 TABLET ORAL EVERY 6 HOURS PRN
COMMUNITY
End: 2021-01-12

## 2018-12-13 NOTE — PROGRESS NOTES
Name: Rocky Nelson   : 1980   DOS: 2018     Pain Clinic Follow Up Visit:   Patient presents with: Follow - Up: pain location = lower back, buttock. Pain has resolved in thighs. Last dose Norco on approx 18.  Last dose Ibuprofen imaging for interpretation    ASSESSMENT AND PLAN:   Lumbar radiculopathy  (primary encounter diagnosis)  DDD (degenerative disc disease), lumbar    The patient is a 16-year-old female with a history of low back pain status post lumbar epidural steroid inj

## 2018-12-13 NOTE — PATIENT INSTRUCTIONS
Refill policies:    • Allow 2-3 business days for refills; controlled substances may take longer.   • Contact your pharmacy at least 5 days prior to running out of medication and have them send an electronic request or submit request through the “request re entire amount billed. Precertification and Prior Authorizations: If your physician has recommended that you have a procedure or additional testing performed.   [facility]) will contact your insurance carrier to obtain pre-certi

## 2019-01-22 ENCOUNTER — TELEPHONE (OUTPATIENT)
Dept: SURGERY | Facility: CLINIC | Age: 39
End: 2019-01-22

## 2019-01-22 ENCOUNTER — OFFICE VISIT (OUTPATIENT)
Dept: SURGERY | Facility: CLINIC | Age: 39
End: 2019-01-22
Payer: COMMERCIAL

## 2019-01-22 VITALS — SYSTOLIC BLOOD PRESSURE: 138 MMHG | HEART RATE: 82 BPM | DIASTOLIC BLOOD PRESSURE: 78 MMHG

## 2019-01-22 DIAGNOSIS — M54.12 CERVICAL MYELOPATHY WITH CERVICAL RADICULOPATHY (HCC): Primary | ICD-10-CM

## 2019-01-22 DIAGNOSIS — R29.898 WEAKNESS OF BOTH LOWER EXTREMITIES: ICD-10-CM

## 2019-01-22 DIAGNOSIS — R26.9 NEUROLOGIC GAIT DYSFUNCTION: ICD-10-CM

## 2019-01-22 DIAGNOSIS — G95.9 CERVICAL MYELOPATHY WITH CERVICAL RADICULOPATHY (HCC): Primary | ICD-10-CM

## 2019-01-22 DIAGNOSIS — R29.898 WEAKNESS OF BOTH UPPER EXTREMITIES: ICD-10-CM

## 2019-01-22 DIAGNOSIS — M51.26 LUMBAR DISC HERNIATION: ICD-10-CM

## 2019-01-22 PROCEDURE — 99214 OFFICE O/P EST MOD 30 MIN: CPT | Performed by: PHYSICIAN ASSISTANT

## 2019-01-22 RX ORDER — HYDROCODONE BITARTRATE AND ACETAMINOPHEN 10; 325 MG/1; MG/1
1 TABLET ORAL EVERY 8 HOURS PRN
Qty: 90 TABLET | Refills: 0 | Status: SHIPPED | OUTPATIENT
Start: 2019-01-22 | End: 2019-02-19

## 2019-01-22 RX ORDER — PREDNISONE 10 MG/1
10 TABLET ORAL DAILY
Qty: 30 TABLET | Refills: 0 | Status: SHIPPED | OUTPATIENT
Start: 2019-01-22 | End: 2021-01-12

## 2019-01-22 NOTE — PROGRESS NOTES
[facility] Neurosurgery follow-up      HISTORY OF PRESENT ILLNESS:Julissa Waldron is a 41year old RH female returns in follow-up. Returns today in follow-up. She continues to have cervical pain and left arm numbness.   She has had 2 episodes where she Signs of cervical pain which is a 3 to a 9/10. She complains of bilateral shoulder pain. She complains of pain in the C6 dermatome bilaterally with tightness in both arms weakness in both arms left greater than right.   Complains of numbness and tingling • TRANSFORAMINAL LUMBAR EPIDURAL STEROID INJECTION BILATERAL Bilateral 12/5/2018    Performed by Ankit Coelho MD at [facility]   • TRANSFORAMINAL LUMBAR EPIDURAL STEROID INJECTION BILATERAL Bilateral 11/28/2018    Performed by Ankit Coelho MD at [facility] Deltoid    Triceps     Biceps        Wrist Extension  Finger extension Thumb Abduction  Thumb adduction Intrinsics   Right         5        5         5          5- 5 4 4 5 4*   Left         5        5         5          5- 5 4 4 5 4*     Lower extr 7.  She was offered to see a surgeon to discuss surgical intervention for her cervical spine she declined at this time. 8.  She remains refractory with her symptoms we will consider a cervical flexion extension MRI and a neurological consult  9.   Predniso

## 2019-01-22 NOTE — PATIENT INSTRUCTIONS
Refill policies:    • Allow 2-3 business days for refills; controlled substances may take longer.   • Contact your pharmacy at least 5 days prior to running out of medication and have them send an electronic request or submit request through the “request re entire amount billed. Precertification and Prior Authorizations: If your physician has recommended that you have a procedure or additional testing performed.   [facility]) will contact your insurance carrier to obtain pre-certi intervention for her cervical spine she declined at this time.   8.  She remains refractory with her symptoms we will consider a cervical flexion extension MRI and a neurological consult  •

## 2019-01-29 NOTE — TELEPHONE ENCOUNTER
pt calling for status on these forms, please send pt [employer] message with update

## 2019-02-19 RX ORDER — HYDROCODONE BITARTRATE AND ACETAMINOPHEN 10; 325 MG/1; MG/1
1 TABLET ORAL EVERY 8 HOURS PRN
Qty: 90 TABLET | Refills: 0 | Status: SHIPPED | OUTPATIENT
Start: 2019-02-19 | End: 2019-08-30

## 2019-04-01 ENCOUNTER — TELEPHONE (OUTPATIENT)
Dept: INTERNAL MEDICINE CLINIC | Facility: CLINIC | Age: 39
End: 2019-04-01

## 2019-04-01 NOTE — TELEPHONE ENCOUNTER
We received medical record request from [facility]  requesting records from 9/10/17-10/9/17-sent to scan stat and scanning

## 2019-04-03 ENCOUNTER — TELEPHONE (OUTPATIENT)
Dept: INTERNAL MEDICINE CLINIC | Facility: CLINIC | Age: 39
End: 2019-04-03

## 2019-04-03 NOTE — TELEPHONE ENCOUNTER
ESTELA received from [facility] requestin/10/17-17  Form sent to scan stat for further processing and a copy of form sent to scanning.

## 2019-04-16 ENCOUNTER — TELEPHONE (OUTPATIENT)
Dept: SURGERY | Facility: CLINIC | Age: 39
End: 2019-04-16

## 2019-04-30 ENCOUNTER — TELEPHONE (OUTPATIENT)
Dept: INTERNAL MEDICINE CLINIC | Facility: CLINIC | Age: 39
End: 2019-04-30

## 2019-04-30 NOTE — TELEPHONE ENCOUNTER
We received med record request from [employer] for DOS 7/16/18 thru present-sent to scanning and scan stat

## 2019-05-28 ENCOUNTER — TELEPHONE (OUTPATIENT)
Dept: INTERNAL MEDICINE CLINIC | Facility: CLINIC | Age: 39
End: 2019-05-28

## 2019-05-28 ENCOUNTER — TELEPHONE (OUTPATIENT)
Dept: SURGERY | Facility: CLINIC | Age: 39
End: 2019-05-28

## 2019-05-28 NOTE — TELEPHONE ENCOUNTER
Dr. Buffy Conti office calling to schedule peer to peer re: pt's LTD, specifically pt's physical abilities, please call back

## 2019-05-31 NOTE — TELEPHONE ENCOUNTER
Contacted [phone] for Steve Agarwal who we believe is coordinating peer to peer on insurance end. The phone just rings and goes to fast busy, unable to get through. Original message to call back came via staff message.

## 2019-06-03 NOTE — TELEPHONE ENCOUNTER
[phone] spoke with Kalyn Friend at Dr. Kirby Mann office indicates patient filled out paperwork with [employer] and listed AD as one of the caring providers for the time frame of her issue.  Dr. Maria De Jesus Cardoza was trying to set up peer to peer to obtain additional inform

## 2019-06-03 NOTE — TELEPHONE ENCOUNTER
FW: Peer to peer   Received: 3 days ago   Message Contents   Sherry Slim [facility] Clinical Staff          Previous Messages      ----- Message -----   From: Jd Kiran RN   Sent: 5/31/2019   8:56 AM   To: [facility] Front Office   Subject: FW: Peer to

## 2019-06-05 ENCOUNTER — TELEPHONE (OUTPATIENT)
Dept: SURGERY | Facility: CLINIC | Age: 39
End: 2019-06-05

## 2019-06-11 NOTE — TELEPHONE ENCOUNTER
Received independent insult and review from [facility] reviewer agreed with the diagnosis and treatment. Recommended returning the patient to a limited duty max 4 hours/day 5 days/week. max standing 30 minutes or total 2 hours.   Max walking 15 minutes at a t

## 2019-08-30 NOTE — TELEPHONE ENCOUNTER
Medication: Norco     Date of last refill: 2/19/19  [place] 4/24/43 #33    Last office visit: 1/22/19  Due back to clinic per last office note:   Follow-up in 8 weeks  Date next office visit scheduled:  9/20/19    L

## 2019-09-03 RX ORDER — HYDROCODONE BITARTRATE AND ACETAMINOPHEN 10; 325 MG/1; MG/1
1 TABLET ORAL 2 TIMES DAILY PRN
Qty: 30 TABLET | Refills: 0 | Status: SHIPPED | OUTPATIENT
Start: 2019-09-03 | End: 2019-10-04

## 2019-09-03 NOTE — TELEPHONE ENCOUNTER
Shasha Urbina approved and change to twice a day she was given 30 tablets to get her through till September 20 appointment    Prescription ready for pickup.     My chart message sent to patient

## 2019-09-17 ENCOUNTER — TELEPHONE (OUTPATIENT)
Dept: SURGERY | Facility: CLINIC | Age: 39
End: 2019-09-17

## 2019-09-17 NOTE — TELEPHONE ENCOUNTER
kimani req for med records from 3/1/19-present from [facility]. No OVs since Jan 2019. Faxed back to [facility] stating this.  Sent req to scanning

## 2019-09-20 ENCOUNTER — OFFICE VISIT (OUTPATIENT)
Dept: SURGERY | Facility: CLINIC | Age: 39
End: 2019-09-20
Payer: MEDICAID

## 2019-09-20 VITALS — DIASTOLIC BLOOD PRESSURE: 78 MMHG | SYSTOLIC BLOOD PRESSURE: 130 MMHG | HEART RATE: 80 BPM

## 2019-09-20 DIAGNOSIS — R29.898 WEAKNESS OF BOTH LOWER EXTREMITIES: ICD-10-CM

## 2019-09-20 DIAGNOSIS — M54.12 CERVICAL MYELOPATHY WITH CERVICAL RADICULOPATHY (HCC): Primary | ICD-10-CM

## 2019-09-20 DIAGNOSIS — G95.9 CERVICAL MYELOPATHY WITH CERVICAL RADICULOPATHY (HCC): Primary | ICD-10-CM

## 2019-09-20 DIAGNOSIS — M51.26 LUMBAR DISC HERNIATION: ICD-10-CM

## 2019-09-20 DIAGNOSIS — R32 URINARY INCONTINENCE, UNSPECIFIED TYPE: ICD-10-CM

## 2019-09-20 DIAGNOSIS — R29.898 WEAKNESS OF BOTH UPPER EXTREMITIES: ICD-10-CM

## 2019-09-20 DIAGNOSIS — R26.9 NEUROLOGIC GAIT DYSFUNCTION: ICD-10-CM

## 2019-09-20 PROCEDURE — 99214 OFFICE O/P EST MOD 30 MIN: CPT | Performed by: PHYSICIAN ASSISTANT

## 2019-09-20 RX ORDER — METHYLPREDNISOLONE 4 MG/1
TABLET ORAL
Qty: 1 PACKAGE | Refills: 1 | Status: SHIPPED | OUTPATIENT
Start: 2019-09-20 | End: 2021-01-12 | Stop reason: ALTCHOICE

## 2019-09-20 NOTE — PROGRESS NOTES
[facility] Neurosurgery follow-up      HISTORY OF PRESENT ILLNESS:Julissa Silvestre is a 46year old RH female returns in follow-up. As her last visit she had increasing symptoms. Her cervical pain is a 4-5 over 10 is getting bilateral arm pain.   Minim for spinal consultation. As a history of having trouble that started back in November December of last year. It seemed to get to the point where she sought treatments in July of this year.   She was having so much back spasms leg pain that she went to the She denies any dizziness but she did have some vertigo with tramadol. She denies biting her tongue trouble choking or swallowing she denies any facial numbness or tingling into the face or tongue.     PAST MEDICAL HISTORY:  Past Medical History:   Mili Alanis Deltoid    Triceps     Biceps        Wrist Extension  Finger extension Thumb Abduction  Thumb adduction Intrinsics   Right         4+*        5         4+*          5- 5 4 4 5 4*   Left         4+*        5         4+*          5- 5 4 4 5 4*     Lo Zane, [address] [facility]  [phone]

## 2019-09-20 NOTE — PATIENT INSTRUCTIONS
Refill policies:    • Allow 2-3 business days for refills; controlled substances may take longer.   • Contact your pharmacy at least 5 days prior to running out of medication and have them send an electronic request or submit request through the “request re Depending on your insurance carrier, approval may take 3-10 days. It is highly recommended patients contact their insurance carrier directly to determine coverage.   If test is done without insurance authorization, patient may be responsible for the entire collar  3. Repeat MRI of the cervical spine  4.   Medrol Dosepak  •

## 2019-09-20 NOTE — PROGRESS NOTES
Pt is here for follow up for revaluation:     Home cervical traction: Ofter      Vista collar Multiple times weekly. cervical epidural: No     Pt states that she is worse since LOV. No new symptome's to be reported.  Pt states more pain in the lower abimbola

## 2019-10-02 NOTE — TELEPHONE ENCOUNTER
Medication: Norco  Mg     Date of last refill: 9/3/19 30 Tabs     [place] 9/50/19 30 Tabs     Last office visit: 9/20/19     Due back to clinic per last office note: Follow up with Dr Gordon Mirza.      Date next

## 2019-10-02 NOTE — TELEPHONE ENCOUNTER
Refill norco        Patient informed of 48 hour refill policy excluding weekends and holidays. Informed patient only patient can  the prescription effective April 1, 2017.   Further explained patient will not receive a call back once prescription is

## 2019-10-04 RX ORDER — HYDROCODONE BITARTRATE AND ACETAMINOPHEN 10; 325 MG/1; MG/1
1 TABLET ORAL 2 TIMES DAILY PRN
Qty: 60 TABLET | Refills: 0 | Status: SHIPPED | OUTPATIENT
Start: 2019-10-04 | End: 2019-11-05

## 2019-10-05 ENCOUNTER — PATIENT MESSAGE (OUTPATIENT)
Dept: SURGERY | Facility: CLINIC | Age: 39
End: 2019-10-05

## 2019-10-05 ENCOUNTER — HOSPITAL ENCOUNTER (OUTPATIENT)
Dept: MRI IMAGING | Facility: HOSPITAL | Age: 39
Discharge: HOME | End: 2019-10-05
Attending: PHYSICIAN ASSISTANT
Payer: MEDICAID

## 2019-10-05 DIAGNOSIS — G95.9 CERVICAL MYELOPATHY WITH CERVICAL RADICULOPATHY (HCC): Primary | ICD-10-CM

## 2019-10-05 DIAGNOSIS — G95.9 CERVICAL MYELOPATHY WITH CERVICAL RADICULOPATHY (HCC): ICD-10-CM

## 2019-10-05 DIAGNOSIS — R29.898 WEAKNESS OF BOTH LOWER EXTREMITIES: ICD-10-CM

## 2019-10-05 DIAGNOSIS — R29.898 WEAKNESS OF BOTH UPPER EXTREMITIES: ICD-10-CM

## 2019-10-05 DIAGNOSIS — M54.12 CERVICAL MYELOPATHY WITH CERVICAL RADICULOPATHY (HCC): Primary | ICD-10-CM

## 2019-10-05 DIAGNOSIS — R26.9 NEUROLOGIC GAIT DYSFUNCTION: ICD-10-CM

## 2019-10-05 DIAGNOSIS — M54.12 CERVICAL MYELOPATHY WITH CERVICAL RADICULOPATHY (HCC): ICD-10-CM

## 2019-10-07 NOTE — PROGRESS NOTES
Pt picked up the order for the MRI will schedule, but has concerns about the tingling she experienced during last MRI. Please advise the patient prior to her scheduling.   Call [phone]

## 2019-10-07 NOTE — TELEPHONE ENCOUNTER
From: Blanca Ramos  To: Jhoana Thorpe  Sent: 10/5/2019 11:58 AM CDT  Subject: Other    Hello Doctor Junie Cardoza today at my MRI appointment the imaging  stopped the appointment and suggested I contact you and describe my sensations.  While

## 2019-10-17 ENCOUNTER — TELEPHONE (OUTPATIENT)
Dept: SURGERY | Facility: CLINIC | Age: 39
End: 2019-10-17

## 2019-10-17 DIAGNOSIS — M54.12 CERVICAL MYELOPATHY WITH CERVICAL RADICULOPATHY (HCC): Primary | ICD-10-CM

## 2019-10-17 DIAGNOSIS — G95.9 CERVICAL MYELOPATHY WITH CERVICAL RADICULOPATHY (HCC): Primary | ICD-10-CM

## 2019-10-17 NOTE — TELEPHONE ENCOUNTER
Called patient to discuss PCP clearance requirement for MRI with anesthesia.   Patient stated that she misunderstood the procedure for anesthesia process, stating: \"I didn't realize that needles and IV's are involved, and I don't want to be poked for the a

## 2019-10-22 RX ORDER — DIAZEPAM 5 MG/1
TABLET ORAL
Qty: 2 TABLET | Refills: 0 | Status: SHIPPED | OUTPATIENT
Start: 2019-10-22 | End: 2021-01-12

## 2019-10-22 NOTE — TELEPHONE ENCOUNTER
New order placed for cervical MRI w/o anesthesia. Valium ordered for pt to use for the MRI. Script sent to pharmacy on file.

## 2019-10-25 NOTE — TELEPHONE ENCOUNTER
LMTCB on personalized voicemail. Forms left in nursing bin. Patient to sign her portion of the form for disability benefit.

## 2019-10-26 ENCOUNTER — HOSPITAL ENCOUNTER (OUTPATIENT)
Dept: MRI IMAGING | Age: 39
Discharge: HOME | End: 2019-10-26
Attending: PHYSICIAN ASSISTANT
Payer: MEDICAID

## 2019-10-26 DIAGNOSIS — M54.12 CERVICAL MYELOPATHY WITH CERVICAL RADICULOPATHY (HCC): ICD-10-CM

## 2019-10-26 DIAGNOSIS — G95.9 CERVICAL MYELOPATHY WITH CERVICAL RADICULOPATHY (HCC): ICD-10-CM

## 2019-10-26 PROCEDURE — 72141 MRI NECK SPINE W/O DYE: CPT | Performed by: PHYSICIAN ASSISTANT

## 2019-10-28 NOTE — TELEPHONE ENCOUNTER
Called patient and informed her that she may come to the office to complete paperwork. Office hours given to patient. Paperwork taken to the front for her to sign. Patient stated that she understood and that she was grateful for the call.

## 2019-11-05 RX ORDER — HYDROCODONE BITARTRATE AND ACETAMINOPHEN 10; 325 MG/1; MG/1
1 TABLET ORAL 2 TIMES DAILY PRN
Qty: 60 TABLET | Refills: 0 | Status: SHIPPED | OUTPATIENT
Start: 2019-11-05 | End: 2021-01-12

## 2019-11-05 NOTE — TELEPHONE ENCOUNTER
Medication: Norco  Mg     Date of last refill: 10/4/19 60 Tabs     [place] Disp[place] 40/6/67 60 Tabs     Last office visit: 9/20/19     Due back to clinic per last office note: Follow up appointment with Dr Jennifer Mejía.

## 2019-11-21 ENCOUNTER — OFFICE VISIT (OUTPATIENT)
Dept: SURGERY | Facility: CLINIC | Age: 39
End: 2019-11-21
Payer: MEDICAID

## 2019-11-21 ENCOUNTER — TELEPHONE (OUTPATIENT)
Dept: SURGERY | Facility: CLINIC | Age: 39
End: 2019-11-21

## 2019-11-21 VITALS — DIASTOLIC BLOOD PRESSURE: 80 MMHG | SYSTOLIC BLOOD PRESSURE: 138 MMHG | HEART RATE: 80 BPM

## 2019-11-21 DIAGNOSIS — M51.26 LUMBAR DISC HERNIATION: ICD-10-CM

## 2019-11-21 DIAGNOSIS — R51.9 HEADACHE DISORDER: ICD-10-CM

## 2019-11-21 DIAGNOSIS — G95.9 CERVICAL MYELOPATHY WITH CERVICAL RADICULOPATHY (HCC): Primary | ICD-10-CM

## 2019-11-21 DIAGNOSIS — R32 URINARY INCONTINENCE, UNSPECIFIED TYPE: ICD-10-CM

## 2019-11-21 DIAGNOSIS — H53.50 VISUAL COLOR CHANGES: ICD-10-CM

## 2019-11-21 DIAGNOSIS — F11.10 NARCOTIC ABUSE (HCC): ICD-10-CM

## 2019-11-21 DIAGNOSIS — R26.9 NEUROLOGIC GAIT DYSFUNCTION: ICD-10-CM

## 2019-11-21 DIAGNOSIS — M54.12 CERVICAL MYELOPATHY WITH CERVICAL RADICULOPATHY (HCC): Primary | ICD-10-CM

## 2019-11-21 DIAGNOSIS — R29.898 WEAKNESS OF BOTH LOWER EXTREMITIES: ICD-10-CM

## 2019-11-21 DIAGNOSIS — R29.898 WEAKNESS OF BOTH UPPER EXTREMITIES: ICD-10-CM

## 2019-11-21 PROCEDURE — 99214 OFFICE O/P EST MOD 30 MIN: CPT | Performed by: PHYSICIAN ASSISTANT

## 2019-11-21 NOTE — PROGRESS NOTES
Pt is here for follow up for SX Discussion. Imaging: MRI of the cervical: Obtained     Pt states that she hs been doing home exercised daily. Pt states that pain is worse since LOV. Pt states that more pain in the neck.  Pt states that more weakness

## 2019-11-21 NOTE — PATIENT INSTRUCTIONS
Refill policies:    • Allow 2-3 business days for refills; controlled substances may take longer.   • Contact your pharmacy at least 5 days prior to running out of medication and have them send an electronic request or submit request through the “request re Depending on your insurance carrier, approval may take 3-10 days. It is highly recommended patients contact their insurance carrier directly to determine coverage.   If test is done without insurance authorization, patient may be responsible for the entire MRI brain, XR cervical spine  4. No norco refills. Will consider refill after negative urinalysis for marijuana and not before 12/5/19  5. We may  C3-4-5-6 ACDF, Stenosis C3-4-5, spondlylosis C5-6  6. Working from home for now  7. See PCP for fatigue  8.

## 2019-11-21 NOTE — PROGRESS NOTES
[facility] Neurosurgery follow-up      HISTORY OF PRESENT ILLNESS:Julissa Mitchell November is a 46year old RH female returns in follow-up. CS collar helps. Feeling like she is getting worse. Pain doesn't subside anymore. Legs getting numb.  CS pain, bilateral ar She stopped using the cervical traction. She started using a stiff cervical collar which has been helping. Last history: 10/30/18  She continues to have neck pain which is a 4 to an 8/10. She is taking Norco 2-3 a day.   She continues to have bilateral She has had Norco for pain which gave her some relief she has had tramadol which causes nausea. She has had 2 Medrol Dosepaks. X-rays of her cervical lumbar spine and MRI of her lumbar spine.     She gets relief with standing regarding her back and neck NEUROLOGICAL:  This patient is alert and orientated x 3. Speech fluent. Comprehension intact. Face is symmetrical.     SPINE:   Gait intact. Negative Mondragon's. Negative straight leg raise.   She has spasticity in the left lower extremity    Upper extre 3.  Cervical spondylosis C4–5 with myelopathy  4. Weakness in the upper and lower extremities  5. Neurological gait dysfunction  6. HA and visual changes    PLAN:  1. Follow-up after imaging or 3 months  2.   Resume Home cervical traction, continue [place]

## 2019-11-21 NOTE — TELEPHONE ENCOUNTER
pt came in & signed disability forms.  endorsed to [facility]

## 2019-11-22 NOTE — TELEPHONE ENCOUNTER
Spoke with MAHNAZ Comer who stated original forms were given back to patient during office visit. No copy was provided to office staff to send to scan.

## 2019-12-12 ENCOUNTER — TELEPHONE (OUTPATIENT)
Dept: SURGERY | Facility: CLINIC | Age: 39
End: 2019-12-12

## 2019-12-12 NOTE — TELEPHONE ENCOUNTER
LMTCB because [facility] & req for med recs from [facility] 8. Pt needs to pay $25 fee for forms to be completed.  Put them in white PSR bin

## 2019-12-16 NOTE — LETTER
18          Julissa Brandon  :  1980      To Whom It May Concern: This patient was seen in our office on 18 .   Work status: Off work until reevaluation  If this office may be of further assistance, please do not hesitate to co
18          [facility] Rebeca COHEN:  1980      To Whom It May Concern: This patient was seen in our office on 18 .   Work status: Off work until reevaluation  If this office may be of further assistance, please do not hesitate
[phone]

## 2019-12-23 ENCOUNTER — TELEPHONE (OUTPATIENT)
Dept: SURGERY | Facility: CLINIC | Age: 39
End: 2019-12-23

## 2019-12-23 NOTE — TELEPHONE ENCOUNTER
LOV on 11/21/19 per MAHNAZ Conrad:    Rosary Harps of cervical spine show loss of cervical lordosis with spondylosis of C3-4 C4-5 severe at C5-6 and C6-7     xr the lumbar spine show normal AP view lateral view shows spondylosis at L2-3 and L5-S1.

## 2020-05-19 ENCOUNTER — TELEPHONE (OUTPATIENT)
Dept: SURGERY | Facility: CLINIC | Age: 40
End: 2020-05-19

## 2020-05-19 NOTE — TELEPHONE ENCOUNTER
Test results MRI Brain & Cervical spine 4 views with Flexion and Extension reports only  forwarded to physician for review.

## 2020-05-26 NOTE — TELEPHONE ENCOUNTER
Patient cervical x-ray report was reviewed. MRI of the brain was also reviewed normal findings. Patient needs to provide a copy of the imaging and would need to make a follow-up appointment however her insurance may be prohibitive.   My chart message se

## 2020-05-27 NOTE — TELEPHONE ENCOUNTER
Yes in office visit prefer with Dr Afsaneh Acosta, but I can see her and also get apt with Dr Afsaneh Acosta

## 2020-05-27 NOTE — TELEPHONE ENCOUNTER
Please call patient for f/u post imaging to review as asked during last office visit.   Patient should preferably f/u with Dr. Johann Pandya , thank you

## 2020-05-29 ENCOUNTER — TELEPHONE (OUTPATIENT)
Dept: SURGERY | Facility: CLINIC | Age: 40
End: 2020-05-29

## 2020-05-29 ENCOUNTER — TELEPHONE (OUTPATIENT)
Dept: INTERNAL MEDICINE CLINIC | Facility: CLINIC | Age: 40
End: 2020-05-29

## 2020-05-29 NOTE — TELEPHONE ENCOUNTER
patient very upset that we do not take her insurance. She is wanting to see if Bryan Michelle can look at her current MRI and give her a call.   In the mean time I informed patient that I would print out her medical records and have them at the  I will c

## 2020-05-29 NOTE — TELEPHONE ENCOUNTER
Outreach   Received: Today   Message Contents   Fermin Crigler, MD  P [facility] Clinical Staff             The patient was last seen in September of 2018 and lost to follow-up. Please either schedule follow-up or if following with alternate PCP, update EMR.  Jag Murray

## 2020-06-08 NOTE — TELEPHONE ENCOUNTER
Pt calling again for results, please call back; pt's insurance is not accepted by [facility], pt would still like to review results

## 2020-06-08 NOTE — TELEPHONE ENCOUNTER
Patient has insurance not accepted here at [facility]. Shaniqua Pike also spoke to patient explaining this issue as she has has this ongoing. Patient asking for imaging results.  Will refer to provider for results, also, should patient establish care with a provider wi

## 2020-06-08 NOTE — TELEPHONE ENCOUNTER
Attempted to call patient again and went to voicemail.     Sent another my chart message to patient with the results of her MRI of the brain and x-rays of the neck    Recommend she find a provider that does spinal surgery that is in her insurance plan

## 2020-06-09 NOTE — TELEPHONE ENCOUNTER
Spoke with pt she stated she will check with her insurance to make sure we are covered she is not sure what plan she has but she does want to see AD.

## 2020-10-02 ENCOUNTER — TELEPHONE (OUTPATIENT)
Dept: INTERNAL MEDICINE CLINIC | Facility: CLINIC | Age: 40
End: 2020-10-02

## 2020-10-02 NOTE — TELEPHONE ENCOUNTER
Received medical records request from [facility] [place]. Sent to [employer] for processing.

## 2020-12-09 ENCOUNTER — OFFICE VISIT (OUTPATIENT)
Dept: FAMILY MEDICINE CLINIC | Facility: CLINIC | Age: 40
End: 2020-12-09
Payer: MEDICAID

## 2020-12-09 VITALS
TEMPERATURE: 97 F | DIASTOLIC BLOOD PRESSURE: 68 MMHG | BODY MASS INDEX: 35.92 KG/M2 | HEART RATE: 101 BPM | HEIGHT: 66.5 IN | WEIGHT: 226.19 LBS | SYSTOLIC BLOOD PRESSURE: 102 MMHG

## 2020-12-09 DIAGNOSIS — M54.12 CERVICAL MYELOPATHY WITH CERVICAL RADICULOPATHY (HCC): Primary | ICD-10-CM

## 2020-12-09 DIAGNOSIS — M54.16 LUMBAR RADICULOPATHY: ICD-10-CM

## 2020-12-09 DIAGNOSIS — G95.9 CERVICAL MYELOPATHY WITH CERVICAL RADICULOPATHY (HCC): Primary | ICD-10-CM

## 2020-12-09 PROCEDURE — 3078F DIAST BP <80 MM HG: CPT | Performed by: FAMILY MEDICINE

## 2020-12-09 PROCEDURE — 3074F SYST BP LT 130 MM HG: CPT | Performed by: FAMILY MEDICINE

## 2020-12-09 PROCEDURE — 99203 OFFICE O/P NEW LOW 30 MIN: CPT | Performed by: FAMILY MEDICINE

## 2020-12-09 PROCEDURE — 3008F BODY MASS INDEX DOCD: CPT | Performed by: FAMILY MEDICINE

## 2020-12-09 RX ORDER — NAPROXEN 500 MG/1
500 TABLET ORAL 2 TIMES DAILY PRN
COMMUNITY
Start: 2020-02-25 | End: 2021-01-12

## 2020-12-09 RX ORDER — LIDOCAINE 50 MG/G
1 PATCH TOPICAL DAILY
COMMUNITY
Start: 2020-09-23 | End: 2020-12-18

## 2020-12-09 RX ORDER — TIZANIDINE 4 MG/1
4 TABLET ORAL 3 TIMES DAILY
COMMUNITY
Start: 2020-09-23 | End: 2021-01-12

## 2020-12-09 RX ORDER — TRAMADOL HYDROCHLORIDE 50 MG/1
TABLET ORAL
COMMUNITY
Start: 2020-09-23 | End: 2021-01-12

## 2020-12-09 NOTE — PROGRESS NOTES
Masoud Everett is a 45year old female.   Patient presents with:  Back Pain: Patient rates pain at 7-8/10, pain radiates to thighs but mostly left thigh, worsening over the last 2 weeks, patient having difficulty walking at times, 2 years ago federico 12/9/2020 ) 1 Package 1   • ibuprofen 800 MG Oral Tab Take 800 mg by mouth every 6 (six) hours as needed for Pain. • DM-Doxylamine-Acetaminophen (NYQUIL COLD & FLU OR) Take by mouth.      • Biotin w/ Vitamins C & E (HAIR SKIN & NAILS GUMMIES OR) Take by edema    ASSESSMENT AND PLAN:   1.  Cervical myelopathy with cervical radiculopathy  Because patient has had MRI scans done that were normal and has had physical therapy and multiple medications prescribed including injections we will have her see physiatry

## 2020-12-18 ENCOUNTER — TELEMEDICINE (OUTPATIENT)
Dept: FAMILY MEDICINE CLINIC | Facility: CLINIC | Age: 40
End: 2020-12-18
Payer: MEDICAID

## 2020-12-18 ENCOUNTER — TELEPHONE (OUTPATIENT)
Dept: FAMILY MEDICINE CLINIC | Facility: CLINIC | Age: 40
End: 2020-12-18

## 2020-12-18 DIAGNOSIS — M54.16 LUMBAR RADICULOPATHY: Primary | ICD-10-CM

## 2020-12-18 PROCEDURE — 99213 OFFICE O/P EST LOW 20 MIN: CPT | Performed by: STUDENT IN AN ORGANIZED HEALTH CARE EDUCATION/TRAINING PROGRAM

## 2020-12-18 RX ORDER — LIDOCAINE 50 MG/G
1 PATCH TOPICAL EVERY 24 HOURS
Qty: 30 PATCH | Refills: 0 | Status: SHIPPED | OUTPATIENT
Start: 2020-12-18 | End: 2021-08-27

## 2020-12-18 RX ORDER — METHOCARBAMOL 500 MG/1
500 TABLET, FILM COATED ORAL 4 TIMES DAILY PRN
Qty: 30 TABLET | Refills: 1 | Status: SHIPPED | OUTPATIENT
Start: 2020-12-18 | End: 2021-08-27

## 2020-12-18 NOTE — PROGRESS NOTES
Virtual Telephone Check-In    [address] verbally consents to a Virtual/Telephone Check-In visit on 12/18/20. Patient has been referred to the [facility] website at [email or web address] to review the yearly Consent to Treat document.     Patient as detailed in the plan of care above. Coding/billing information is submitted for this visit based on complexity of care and/or time spent for the visit. HPI:    Patient ID: Sihloh Rollins is a 45year old female.     HPI  Pt presenting via vi prescribed (Patient not taking: Reported on 12/9/2020 ) 1 Package 1   • predniSONE 10 MG Oral Tab Take 1 tablet (10 mg total) by mouth daily.  40 mg per day x 4 days, 30 mg per day x 3 days, 20 mg per day x 2 days, 10 mg x 1 day 30 tablet 0   • ibuprofen 80 pain).       Imaging & Referrals:  None         #[id number]

## 2020-12-18 NOTE — TELEPHONE ENCOUNTER
Pt stated that she saw Dr. Fady Arce on 12/9 due to her neck pain and back pain. Pt stated that she tried to call the Physiatry that she had referred her to and was told that they do not take her insurance.  Pt started to cry and stated that she will like to ge

## 2021-01-12 ENCOUNTER — OFFICE VISIT (OUTPATIENT)
Dept: FAMILY MEDICINE CLINIC | Facility: CLINIC | Age: 41
End: 2021-01-12
Payer: MEDICAID

## 2021-01-12 ENCOUNTER — NURSE TRIAGE (OUTPATIENT)
Dept: FAMILY MEDICINE CLINIC | Facility: CLINIC | Age: 41
End: 2021-01-12

## 2021-01-12 VITALS
HEART RATE: 99 BPM | TEMPERATURE: 98 F | OXYGEN SATURATION: 98 % | DIASTOLIC BLOOD PRESSURE: 82 MMHG | HEIGHT: 66.5 IN | SYSTOLIC BLOOD PRESSURE: 136 MMHG | WEIGHT: 226 LBS | BODY MASS INDEX: 35.89 KG/M2

## 2021-01-12 DIAGNOSIS — M54.42 CHRONIC LEFT-SIDED LOW BACK PAIN WITH LEFT-SIDED SCIATICA: ICD-10-CM

## 2021-01-12 DIAGNOSIS — N92.6 IRREGULAR MENSES: ICD-10-CM

## 2021-01-12 DIAGNOSIS — M54.16 LUMBAR RADICULOPATHY: Primary | ICD-10-CM

## 2021-01-12 DIAGNOSIS — M51.36 DDD (DEGENERATIVE DISC DISEASE), LUMBAR: ICD-10-CM

## 2021-01-12 DIAGNOSIS — G89.29 CHRONIC LEFT-SIDED LOW BACK PAIN WITH LEFT-SIDED SCIATICA: ICD-10-CM

## 2021-01-12 LAB
APPEARANCE: CLEAR
BILIRUBIN: NEGATIVE
CONTROL LINE PRESENT WITH A CLEAR BACKGROUND (YES/NO): YES YES/NO
GLUCOSE (URINE DIPSTICK): NEGATIVE MG/DL
KETONES (URINE DIPSTICK): NEGATIVE MG/DL
LEUKOCYTES: NEGATIVE
MULTISTIX LOT#: 1044 NUMERIC
NITRITE, URINE: NEGATIVE
OCCULT BLOOD: NEGATIVE
PH, URINE: 5 (ref 4.5–8)
PREGNANCY TEST, URINE: NEGATIVE
PROTEIN (URINE DIPSTICK): NEGATIVE MG/DL
SPECIFIC GRAVITY: 1.03 (ref 1–1.03)
URINE-COLOR: YELLOW
UROBILINOGEN,SEMI-QN: 0.2 MG/DL (ref 0–1.9)

## 2021-01-12 PROCEDURE — 99214 OFFICE O/P EST MOD 30 MIN: CPT | Performed by: FAMILY MEDICINE

## 2021-01-12 PROCEDURE — 81025 URINE PREGNANCY TEST: CPT | Performed by: FAMILY MEDICINE

## 2021-01-12 PROCEDURE — 81002 URINALYSIS NONAUTO W/O SCOPE: CPT | Performed by: FAMILY MEDICINE

## 2021-01-12 PROCEDURE — 3008F BODY MASS INDEX DOCD: CPT | Performed by: FAMILY MEDICINE

## 2021-01-12 PROCEDURE — 3075F SYST BP GE 130 - 139MM HG: CPT | Performed by: FAMILY MEDICINE

## 2021-01-12 PROCEDURE — 3079F DIAST BP 80-89 MM HG: CPT | Performed by: FAMILY MEDICINE

## 2021-01-12 RX ORDER — HYDROCODONE BITARTRATE AND ACETAMINOPHEN 5; 325 MG/1; MG/1
1 TABLET ORAL EVERY 8 HOURS PRN
Qty: 30 TABLET | Refills: 0 | Status: SHIPPED | OUTPATIENT
Start: 2021-01-12 | End: 2021-08-27

## 2021-01-12 RX ORDER — METHYLPREDNISOLONE 4 MG/1
TABLET ORAL
Qty: 1 KIT | Refills: 0 | Status: SHIPPED | OUTPATIENT
Start: 2021-01-12 | End: 2021-06-15

## 2021-01-12 NOTE — PROGRESS NOTES
HPI:    Patient ID: Annie Stephens is a 45year old female.     HPI  Patient presents with:  Low Back Pain: pain radiates to left leg X 1 month feels it numb     Patient new to me   Seeing orthopedics spine    Last MRI lumbar spine was done in Augu urinating, dysuria, pelvic pain and urgency. Irregular menses   Musculoskeletal: Positive for back pain, gait problem and myalgias. Negative for arthralgias, joint swelling, neck pain and neck stiffness.    Psychiatric/Behavioral:        Very tearful lumbar  Chronic left-sided low back pain with left-sided sciatica  Irregular menses    1. Lumbar radiculopathy  Reviewed previous MRI. Recommend follow-up with neurosurgery.   For time being to control pain would recommend Medrol Dosepak and repeat MRI lum

## 2021-01-12 NOTE — TELEPHONE ENCOUNTER
Action Requested: Summary for Provider     []  Critical Lab, Recommendations Needed  [] Need Additional Advice  []   FYI    []   Need Orders  [] Need Medications Sent to Pharmacy  [x]  Other     SUMMARY: Verified name and .   Patient reports she is michael

## 2021-01-26 ENCOUNTER — HOSPITAL ENCOUNTER (OUTPATIENT)
Dept: MRI IMAGING | Facility: HOSPITAL | Age: 41
Discharge: HOME | End: 2021-01-26
Attending: FAMILY MEDICINE
Payer: MEDICAID

## 2021-01-26 DIAGNOSIS — M54.16 LUMBAR RADICULOPATHY: ICD-10-CM

## 2021-01-26 PROCEDURE — 72148 MRI LUMBAR SPINE W/O DYE: CPT | Performed by: FAMILY MEDICINE

## 2021-06-15 ENCOUNTER — OFFICE VISIT (OUTPATIENT)
Dept: FAMILY MEDICINE CLINIC | Facility: CLINIC | Age: 41
End: 2021-06-15
Payer: MEDICAID

## 2021-06-15 VITALS
SYSTOLIC BLOOD PRESSURE: 126 MMHG | HEIGHT: 66.5 IN | BODY MASS INDEX: 34.59 KG/M2 | HEART RATE: 93 BPM | WEIGHT: 217.81 LBS | DIASTOLIC BLOOD PRESSURE: 86 MMHG | TEMPERATURE: 97 F

## 2021-06-15 DIAGNOSIS — M54.16 LUMBAR RADICULOPATHY: ICD-10-CM

## 2021-06-15 DIAGNOSIS — Z12.4 PAP SMEAR FOR CERVICAL CANCER SCREENING: ICD-10-CM

## 2021-06-15 DIAGNOSIS — Z11.3 SCREEN FOR STD (SEXUALLY TRANSMITTED DISEASE): ICD-10-CM

## 2021-06-15 DIAGNOSIS — Z00.00 ROUTINE HEALTH MAINTENANCE: Primary | ICD-10-CM

## 2021-06-15 PROCEDURE — 3079F DIAST BP 80-89 MM HG: CPT | Performed by: FAMILY MEDICINE

## 2021-06-15 PROCEDURE — 3074F SYST BP LT 130 MM HG: CPT | Performed by: FAMILY MEDICINE

## 2021-06-15 PROCEDURE — 99395 PREV VISIT EST AGE 18-39: CPT | Performed by: FAMILY MEDICINE

## 2021-06-15 PROCEDURE — 3008F BODY MASS INDEX DOCD: CPT | Performed by: FAMILY MEDICINE

## 2021-06-15 NOTE — PROGRESS NOTES
Carina Perry is a 45year old female.   Patient presents with:  Routine Physical: no concerns  Pap: interested in STD testing  Pain: knee pain and swelling, back pain, pt not interested in medication or surgery, interested in seeing a specialist 7/26/2018, occasionally    Drug use: No       REVIEW OF SYSTEMS:   GENERAL HEALTH: No fevers, chills, sweats, fatigue  VISION: No recent vision problems, blurry vision or double vision  HEENT: No decreased hearing ear pain nasal congestion or sore throat transmitted disease)  STD screens today. The patient indicates understanding of these issues and agrees to the plan. No follow-ups on file.

## 2021-08-27 ENCOUNTER — OFFICE VISIT (OUTPATIENT)
Dept: FAMILY MEDICINE CLINIC | Facility: CLINIC | Age: 41
End: 2021-08-27
Payer: MEDICAID

## 2021-08-27 VITALS
SYSTOLIC BLOOD PRESSURE: 122 MMHG | DIASTOLIC BLOOD PRESSURE: 68 MMHG | WEIGHT: 224.5 LBS | HEIGHT: 66.5 IN | BODY MASS INDEX: 35.65 KG/M2 | TEMPERATURE: 98 F | RESPIRATION RATE: 16 BRPM | HEART RATE: 66 BPM

## 2021-08-27 DIAGNOSIS — M51.36 DDD (DEGENERATIVE DISC DISEASE), LUMBAR: ICD-10-CM

## 2021-08-27 DIAGNOSIS — M62.89 MUSCLE TIGHTNESS: ICD-10-CM

## 2021-08-27 DIAGNOSIS — G89.29 CHRONIC BILATERAL LOW BACK PAIN WITH BILATERAL SCIATICA: Primary | ICD-10-CM

## 2021-08-27 DIAGNOSIS — Z12.31 ENCOUNTER FOR SCREENING MAMMOGRAM FOR MALIGNANT NEOPLASM OF BREAST: ICD-10-CM

## 2021-08-27 DIAGNOSIS — M54.42 CHRONIC BILATERAL LOW BACK PAIN WITH BILATERAL SCIATICA: Primary | ICD-10-CM

## 2021-08-27 DIAGNOSIS — E66.9 OBESITY (BMI 30-39.9): ICD-10-CM

## 2021-08-27 DIAGNOSIS — M54.12 CERVICAL RADICULOPATHY: ICD-10-CM

## 2021-08-27 DIAGNOSIS — M54.2 NECK PAIN: ICD-10-CM

## 2021-08-27 DIAGNOSIS — Z23 NEED FOR VACCINATION: ICD-10-CM

## 2021-08-27 DIAGNOSIS — M54.41 CHRONIC BILATERAL LOW BACK PAIN WITH BILATERAL SCIATICA: Primary | ICD-10-CM

## 2021-08-27 PROCEDURE — 3008F BODY MASS INDEX DOCD: CPT | Performed by: NURSE PRACTITIONER

## 2021-08-27 PROCEDURE — 3078F DIAST BP <80 MM HG: CPT | Performed by: NURSE PRACTITIONER

## 2021-08-27 PROCEDURE — 3074F SYST BP LT 130 MM HG: CPT | Performed by: NURSE PRACTITIONER

## 2021-08-27 PROCEDURE — 99214 OFFICE O/P EST MOD 30 MIN: CPT | Performed by: NURSE PRACTITIONER

## 2021-08-27 PROCEDURE — 96127 BRIEF EMOTIONAL/BEHAV ASSMT: CPT | Performed by: NURSE PRACTITIONER

## 2021-08-27 RX ORDER — PREDNISONE 20 MG/1
TABLET ORAL
Qty: 16 TABLET | Refills: 0 | Status: SHIPPED | OUTPATIENT
Start: 2021-08-27 | End: 2021-09-14

## 2021-08-27 RX ORDER — CYCLOBENZAPRINE HCL 10 MG
10 TABLET ORAL 3 TIMES DAILY PRN
Qty: 15 TABLET | Refills: 0 | Status: SHIPPED | OUTPATIENT
Start: 2021-08-27

## 2021-08-27 NOTE — PROGRESS NOTES
Melanie Buenrostro is a 37year old female. HPI:   Patient presents today as a new patient to establish care and discuss chronic low back pain and neck pain.  She has had chronic low with bilateral sciatica from 2018- has seen Dr. LEX STOREY previously and Types: Cannabis      Comment: cooks with it and samples her cooking       REVIEW OF SYSTEMS:   GENERAL HEALTH: feels well otherwise  RESPIRATORY: denies shortness of breath with exertion  CARDIOVASCULAR: denies chest pain on exertion  GI: denies abdominal radiculopathy  Neck pain  Muscle tightness  Obesity (bmi 30-39. 9)  Encounter for screening mammogram for malignant neoplasm of breast  Need for vaccination    No orders of the defined types were placed in this encounter.       Meds & Refills for this Visit: 3 (three) times daily as needed for Muscle spasms. NO DRIVING OR DRINKING ALCOHOL. CAN CAUSE DROWSINESS  Dispense: 15 tablet; Refill: 0    4. Neck pain  - XR CERVICAL SPINE (4VIEWS) (CPT=56896); Future    5.  Muscle tightness  - cyclobenzaprine 10 MG Oral T

## 2021-09-01 ENCOUNTER — TELEPHONE (OUTPATIENT)
Dept: SURGERY | Facility: CLINIC | Age: 41
End: 2021-09-01

## 2021-09-14 ENCOUNTER — TELEPHONE (OUTPATIENT)
Dept: SURGERY | Facility: CLINIC | Age: 41
End: 2021-09-14

## 2021-09-14 ENCOUNTER — OFFICE VISIT (OUTPATIENT)
Dept: SURGERY | Facility: CLINIC | Age: 41
End: 2021-09-14
Payer: MEDICAID

## 2021-09-14 VITALS
HEIGHT: 66.5 IN | DIASTOLIC BLOOD PRESSURE: 70 MMHG | BODY MASS INDEX: 35.57 KG/M2 | SYSTOLIC BLOOD PRESSURE: 124 MMHG | WEIGHT: 224 LBS

## 2021-09-14 DIAGNOSIS — M54.16 LUMBAR RADICULOPATHY: Primary | ICD-10-CM

## 2021-09-14 DIAGNOSIS — R20.0 ARM NUMBNESS: ICD-10-CM

## 2021-09-14 PROCEDURE — 3078F DIAST BP <80 MM HG: CPT | Performed by: PHYSICIAN ASSISTANT

## 2021-09-14 PROCEDURE — 3008F BODY MASS INDEX DOCD: CPT | Performed by: PHYSICIAN ASSISTANT

## 2021-09-14 PROCEDURE — 99214 OFFICE O/P EST MOD 30 MIN: CPT | Performed by: PHYSICIAN ASSISTANT

## 2021-09-14 PROCEDURE — 3074F SYST BP LT 130 MM HG: CPT | Performed by: PHYSICIAN ASSISTANT

## 2021-09-14 NOTE — PROGRESS NOTES
Neurosurgery Clinic Visit  2021    Robert Mitchell PCP:  Ana Cristina Barrera MD    1980 MRN [id number]       CC:  Pain, Numbness    HPI:    Ava Costa is a very pleasant 37year old female who was previously seen by Dr. Afsaneh Acosta.   Last seen 1 the presenting symptoms she had back in November December. Complains of heaviness in the legs. She has pain going into the left thigh she has left focal knee pain she is unable to weight-bear on the left leg.   She has numbness and tingling in both legs l • Anemia    • Dysmenorrhea    • Fibroids    • H/O low back pain    • Pinched nerve in neck    • Sciatica      Social History    Socioeconomic History      Marital status: Single    Tobacco Use      Smoking status: Never Smoker      Smokeless tobacco: Nev Trochanteric Bursa + N    Upper extremity strength:      Deltoid Biceps Triceps Wrist Extension  Finger Abduction Finger Extension Thumb Opposition   Right 5 5 5 5 5 5 5 5   Left 5 5 5 5 5 5 5 5   Lower extremity strength:    Iliopsoas Quad Hamstring

## 2021-09-14 NOTE — PROGRESS NOTES
Previously had injections with Dr. Tessa Aguillon to get them yearly, but concerned with the damage to bone, so is a bit nervous to get more injections    Got a new mattress recently to help with things, but seems that it has made things worse  Recently snyder

## 2021-09-20 ENCOUNTER — HOSPITAL ENCOUNTER (OUTPATIENT)
Dept: GENERAL RADIOLOGY | Facility: HOSPITAL | Age: 41
Discharge: HOME | End: 2021-09-20
Attending: PHYSICIAN ASSISTANT
Payer: MEDICAID

## 2021-09-20 ENCOUNTER — OFFICE VISIT (OUTPATIENT)
Dept: PAIN CLINIC | Facility: CLINIC | Age: 41
End: 2021-09-20
Payer: MEDICAID

## 2021-09-20 VITALS
HEIGHT: 66.5 IN | RESPIRATION RATE: 18 BRPM | HEART RATE: 88 BPM | OXYGEN SATURATION: 98 % | BODY MASS INDEX: 34.94 KG/M2 | WEIGHT: 220 LBS | DIASTOLIC BLOOD PRESSURE: 72 MMHG | SYSTOLIC BLOOD PRESSURE: 110 MMHG

## 2021-09-20 DIAGNOSIS — M51.36 DDD (DEGENERATIVE DISC DISEASE), LUMBAR: ICD-10-CM

## 2021-09-20 DIAGNOSIS — M54.16 LUMBAR RADICULOPATHY: ICD-10-CM

## 2021-09-20 DIAGNOSIS — M54.16 LUMBAR RADICULOPATHY: Primary | ICD-10-CM

## 2021-09-20 PROCEDURE — 3008F BODY MASS INDEX DOCD: CPT | Performed by: ANESTHESIOLOGY

## 2021-09-20 PROCEDURE — 3074F SYST BP LT 130 MM HG: CPT | Performed by: ANESTHESIOLOGY

## 2021-09-20 PROCEDURE — 72114 X-RAY EXAM L-S SPINE BENDING: CPT | Performed by: PHYSICIAN ASSISTANT

## 2021-09-20 PROCEDURE — 99214 OFFICE O/P EST MOD 30 MIN: CPT | Performed by: ANESTHESIOLOGY

## 2021-09-20 PROCEDURE — 3078F DIAST BP <80 MM HG: CPT | Performed by: ANESTHESIOLOGY

## 2021-09-20 NOTE — H&P
Name: Mamadou Man   : 1980   DOS: 2021     Chief complaint: Low back and neck pain    History of present illness:  Mamadou Man is a 37year old female with a history of obesity, who presents today for evaluation of neck systems:  10 point ROS otherwise negative    Physical examination: En Perez is a [address]year old female not in acute distress  /72   Pulse 88   Resp 18   Ht 66.5\"   Wt 220 lb (99.8 kg)   LMP 09/08/2021 (Approximate)   SpO2 98%   BMI 34.98 kg/m²    The p of axial low back pain which sometimes is so severe that she has difficulty moving.   I did review her lumbar MRI and while she does have left paracentral disc protrusion from her January 2021 imaging, this is not clearly explain her significant worsening b

## 2021-09-20 NOTE — PROGRESS NOTES
Subjective:   Patient ID: Blanca Ramos is a 37year old female.     HPI    History/Other:   Review of Systems  Current Outpatient Medications   Medication Sig Dispense Refill   • cyclobenzaprine 10 MG Oral Tab Take 1 tablet (10 mg total) by mouth

## 2022-02-17 NOTE — TELEPHONE ENCOUNTER
Can office provide OV notes, imaging reports and letters? Faxed all records to [facility] at [phone], rcv'd confirmation.  Placed all records in [facility] for  by Rogerio Henry [phone] per Pts request pt is a&ox4, no acute distress noted

## 2022-05-31 ENCOUNTER — TELEPHONE (OUTPATIENT)
Dept: SURGERY | Facility: CLINIC | Age: 42
End: 2022-05-31

## 2022-05-31 NOTE — TELEPHONE ENCOUNTER
Received Attending Physician Statement for pt's disability claim dated 5.31.22 from [employer].   Endorsed to [employer]

## 2022-06-03 NOTE — TELEPHONE ENCOUNTER
Patient has not been seen since 9-14-21. Per Lolly Birto, defer to PCP. Paperwork faxed back to [facility] Shirley Gutierrez indicating so. [employer]'s what's in store message sent. Mailed form to patient.

## 2022-12-16 ENCOUNTER — LAB ENCOUNTER (OUTPATIENT)
Dept: LAB | Age: 42
End: 2022-12-16
Attending: FAMILY MEDICINE
Payer: MEDICAID

## 2022-12-16 ENCOUNTER — OFFICE VISIT (OUTPATIENT)
Dept: FAMILY MEDICINE CLINIC | Facility: CLINIC | Age: 42
End: 2022-12-16
Payer: MEDICAID

## 2022-12-16 ENCOUNTER — TELEPHONE (OUTPATIENT)
Dept: FAMILY MEDICINE CLINIC | Facility: CLINIC | Age: 42
End: 2022-12-16

## 2022-12-16 VITALS
DIASTOLIC BLOOD PRESSURE: 72 MMHG | HEART RATE: 96 BPM | SYSTOLIC BLOOD PRESSURE: 118 MMHG | OXYGEN SATURATION: 98 % | BODY MASS INDEX: 37.26 KG/M2 | WEIGHT: 234.63 LBS | HEIGHT: 66.5 IN

## 2022-12-16 DIAGNOSIS — M54.50 ACUTE BILATERAL LOW BACK PAIN WITHOUT SCIATICA: Primary | ICD-10-CM

## 2022-12-16 DIAGNOSIS — N85.8 UTERINE MASS: ICD-10-CM

## 2022-12-16 DIAGNOSIS — R07.89 ATYPICAL CHEST PAIN: ICD-10-CM

## 2022-12-16 DIAGNOSIS — R31.29 MICROSCOPIC HEMATURIA: ICD-10-CM

## 2022-12-16 DIAGNOSIS — Z13.220 LIPID SCREENING: ICD-10-CM

## 2022-12-16 DIAGNOSIS — Z13.1 DIABETES MELLITUS SCREENING: ICD-10-CM

## 2022-12-16 DIAGNOSIS — R07.89 ATYPICAL CHEST PAIN: Primary | ICD-10-CM

## 2022-12-16 LAB
ALBUMIN SERPL-MCNC: 3.4 G/DL (ref 3.4–5)
ALBUMIN/GLOB SERPL: 0.9 {RATIO} (ref 1–2)
ALP LIVER SERPL-CCNC: 72 U/L
ALT SERPL-CCNC: 27 U/L
ANION GAP SERPL CALC-SCNC: 4 MMOL/L (ref 0–18)
AST SERPL-CCNC: 16 U/L (ref 15–37)
BILIRUB SERPL-MCNC: 0.2 MG/DL (ref 0.1–2)
BILIRUBIN: NEGATIVE
BUN BLD-MCNC: 7 MG/DL (ref 7–18)
BUN/CREAT SERPL: 10 (ref 10–20)
CALCIUM BLD-MCNC: 9.4 MG/DL (ref 8.5–10.1)
CHLORIDE SERPL-SCNC: 107 MMOL/L (ref 98–112)
CHOLEST SERPL-MCNC: 145 MG/DL (ref ?–200)
CO2 SERPL-SCNC: 29 MMOL/L (ref 21–32)
CREAT BLD-MCNC: 0.7 MG/DL
EST. AVERAGE GLUCOSE BLD GHB EST-MCNC: 123 MG/DL (ref 68–126)
FASTING PATIENT LIPID ANSWER: YES
FASTING STATUS PATIENT QL REPORTED: YES
GFR SERPLBLD BASED ON 1.73 SQ M-ARVRAT: 111 ML/MIN/1.73M2 (ref 60–?)
GLOBULIN PLAS-MCNC: 3.8 G/DL (ref 2.8–4.4)
GLUCOSE (URINE DIPSTICK): NEGATIVE MG/DL
GLUCOSE BLD-MCNC: 87 MG/DL (ref 70–99)
HBA1C MFR BLD: 5.9 % (ref ?–5.7)
HDLC SERPL-MCNC: 48 MG/DL (ref 40–59)
KETONES (URINE DIPSTICK): NEGATIVE MG/DL
LDLC SERPL CALC-MCNC: 84 MG/DL (ref ?–100)
LEUKOCYTES: NEGATIVE
MULTISTIX LOT#: ABNORMAL NUMERIC
NITRITE, URINE: NEGATIVE
NONHDLC SERPL-MCNC: 97 MG/DL (ref ?–130)
OSMOLALITY SERPL CALC.SUM OF ELEC: 287 MOSM/KG (ref 275–295)
PH, URINE: 6 (ref 4.5–8)
POTASSIUM SERPL-SCNC: 4.1 MMOL/L (ref 3.5–5.1)
PROT SERPL-MCNC: 7.2 G/DL (ref 6.4–8.2)
SODIUM SERPL-SCNC: 140 MMOL/L (ref 136–145)
SPECIFIC GRAVITY: 1.02 (ref 1–1.03)
TRIGL SERPL-MCNC: 63 MG/DL (ref 30–149)
TSI SER-ACNC: 0.69 MIU/ML (ref 0.36–3.74)
URINE-COLOR: YELLOW
UROBILINOGEN,SEMI-QN: 0.2 MG/DL (ref 0–1.9)
VLDLC SERPL CALC-MCNC: 10 MG/DL (ref 0–30)

## 2022-12-16 PROCEDURE — 81002 URINALYSIS NONAUTO W/O SCOPE: CPT | Performed by: FAMILY MEDICINE

## 2022-12-16 PROCEDURE — 83036 HEMOGLOBIN GLYCOSYLATED A1C: CPT | Performed by: FAMILY MEDICINE

## 2022-12-16 PROCEDURE — 93010 ELECTROCARDIOGRAM REPORT: CPT | Performed by: INTERNAL MEDICINE

## 2022-12-16 PROCEDURE — 36415 COLL VENOUS BLD VENIPUNCTURE: CPT | Performed by: FAMILY MEDICINE

## 2022-12-16 PROCEDURE — 93005 ELECTROCARDIOGRAM TRACING: CPT

## 2022-12-16 PROCEDURE — 3074F SYST BP LT 130 MM HG: CPT | Performed by: FAMILY MEDICINE

## 2022-12-16 PROCEDURE — 3008F BODY MASS INDEX DOCD: CPT | Performed by: FAMILY MEDICINE

## 2022-12-16 PROCEDURE — 80053 COMPREHEN METABOLIC PANEL: CPT | Performed by: FAMILY MEDICINE

## 2022-12-16 PROCEDURE — 84443 ASSAY THYROID STIM HORMONE: CPT | Performed by: FAMILY MEDICINE

## 2022-12-16 PROCEDURE — 99215 OFFICE O/P EST HI 40 MIN: CPT | Performed by: FAMILY MEDICINE

## 2022-12-16 PROCEDURE — 3078F DIAST BP <80 MM HG: CPT | Performed by: FAMILY MEDICINE

## 2022-12-16 PROCEDURE — 80061 LIPID PANEL: CPT | Performed by: FAMILY MEDICINE

## 2022-12-16 RX ORDER — NAPROXEN 500 MG/1
500 TABLET ORAL 2 TIMES DAILY WITH MEALS
Qty: 28 TABLET | Refills: 0 | Status: SHIPPED | OUTPATIENT
Start: 2022-12-16 | End: 2022-12-30

## 2022-12-16 NOTE — PATIENT INSTRUCTIONS
Complete bloodwork and EKG    Start icing back and taking naproxen twice daily with food. Begin to do home stretches/exercises given and make appointment with Physical therapy.

## 2022-12-19 LAB
ATRIAL RATE: 72 BPM
P AXIS: 55 DEGREES
P-R INTERVAL: 164 MS
Q-T INTERVAL: 382 MS
QRS DURATION: 78 MS
QTC CALCULATION (BEZET): 418 MS
R AXIS: -21 DEGREES
T AXIS: 29 DEGREES
VENTRICULAR RATE: 72 BPM

## 2022-12-29 NOTE — TELEPHONE ENCOUNTER
Message forwarded to all involved parties for input.       Providers will need to discus among themselves and inform  if this is acceptable or not
Patient is requesting to schedule an apt with  per referral from MARCI Santana. Patient was previously seen with Sheryl JOYA and [address] [place] [address], [address] [place] [address] 11/21/19. Patient would like KIMMY from  to .  Please advise with decision
Please defer to Dr. Aretha Coppola and Dr. Anne Dasilva apps cannot determine this.
Spoke with dr Gus Soares to schedule with any of us
Spoke with patient. Patient was agreeable to schedule with first available apt with MAHNAZ Peterson Late since last MRI of cervical spine was early 2019. Patient has been scheduled for 9/14.
TE never routed to [facility] pool
This was routed to Dr. Erinn Miramontes and Dr. Edith Goodman on 9/1/21-waiting for input.     Originally also routed to [facility], since he is the provider who has seen this patient and is familiar with patient's case
0 = understands/communicates without difficulty

## 2023-02-17 ENCOUNTER — HOSPITAL ENCOUNTER (EMERGENCY)
Facility: HOSPITAL | Age: 43
Discharge: HOME | End: 2023-02-18
Attending: EMERGENCY MEDICINE
Payer: MEDICAID

## 2023-02-17 ENCOUNTER — APPOINTMENT (OUTPATIENT)
Dept: GENERAL RADIOLOGY | Facility: HOSPITAL | Age: 43
End: 2023-02-17
Attending: EMERGENCY MEDICINE
Payer: MEDICAID

## 2023-02-17 ENCOUNTER — APPOINTMENT (OUTPATIENT)
Dept: CT IMAGING | Facility: HOSPITAL | Age: 43
End: 2023-02-17
Attending: EMERGENCY MEDICINE
Payer: MEDICAID

## 2023-02-17 DIAGNOSIS — K62.5 RECTAL BLEEDING: ICD-10-CM

## 2023-02-17 DIAGNOSIS — M54.9 UPPER BACK PAIN: Primary | ICD-10-CM

## 2023-02-17 DIAGNOSIS — D64.9 ANEMIA, UNSPECIFIED TYPE: ICD-10-CM

## 2023-02-17 LAB
ALBUMIN SERPL-MCNC: 3.6 G/DL (ref 3.4–5)
ALBUMIN/GLOB SERPL: 0.9 {RATIO} (ref 1–2)
ALP LIVER SERPL-CCNC: 75 U/L
ALT SERPL-CCNC: 30 U/L
ANION GAP SERPL CALC-SCNC: 2 MMOL/L (ref 0–18)
AST SERPL-CCNC: 14 U/L (ref 15–37)
BASOPHILS # BLD AUTO: 0.05 X10(3) UL (ref 0–0.2)
BASOPHILS NFR BLD AUTO: 0.4 %
BILIRUB SERPL-MCNC: 0.2 MG/DL (ref 0.1–2)
BUN BLD-MCNC: 10 MG/DL (ref 7–18)
CALCIUM BLD-MCNC: 9.1 MG/DL (ref 8.5–10.1)
CHLORIDE SERPL-SCNC: 109 MMOL/L (ref 98–112)
CO2 SERPL-SCNC: 27 MMOL/L (ref 21–32)
CREAT BLD-MCNC: 0.84 MG/DL
D DIMER PPP FEU-MCNC: <0.27 UG/ML FEU (ref ?–0.5)
EOSINOPHIL # BLD AUTO: 0.03 X10(3) UL (ref 0–0.7)
EOSINOPHIL NFR BLD AUTO: 0.2 %
ERYTHROCYTE [DISTWIDTH] IN BLOOD BY AUTOMATED COUNT: 13.7 %
FLUAV + FLUBV RNA SPEC NAA+PROBE: NEGATIVE
FLUAV + FLUBV RNA SPEC NAA+PROBE: NEGATIVE
GFR SERPLBLD BASED ON 1.73 SQ M-ARVRAT: 89 ML/MIN/1.73M2 (ref 60–?)
GLOBULIN PLAS-MCNC: 4.1 G/DL (ref 2.8–4.4)
GLUCOSE BLD-MCNC: 110 MG/DL (ref 70–99)
HCT VFR BLD AUTO: 35.6 %
HGB BLD-MCNC: 11.3 G/DL
IMM GRANULOCYTES # BLD AUTO: 0.05 X10(3) UL (ref 0–1)
IMM GRANULOCYTES NFR BLD: 0.4 %
LYMPHOCYTES # BLD AUTO: 1.91 X10(3) UL (ref 1–4)
LYMPHOCYTES NFR BLD AUTO: 15.2 %
MCH RBC QN AUTO: 25.6 PG (ref 26–34)
MCHC RBC AUTO-ENTMCNC: 31.7 G/DL (ref 31–37)
MCV RBC AUTO: 80.5 FL
MONOCYTES # BLD AUTO: 0.97 X10(3) UL (ref 0.1–1)
MONOCYTES NFR BLD AUTO: 7.7 %
NEUTROPHILS # BLD AUTO: 9.52 X10 (3) UL (ref 1.5–7.7)
NEUTROPHILS # BLD AUTO: 9.52 X10(3) UL (ref 1.5–7.7)
NEUTROPHILS NFR BLD AUTO: 76.1 %
OSMOLALITY SERPL CALC.SUM OF ELEC: 286 MOSM/KG (ref 275–295)
PLATELET # BLD AUTO: 441 10(3)UL (ref 150–450)
POTASSIUM SERPL-SCNC: 3.7 MMOL/L (ref 3.5–5.1)
PROT SERPL-MCNC: 7.7 G/DL (ref 6.4–8.2)
RBC # BLD AUTO: 4.42 X10(6)UL
RSV RNA SPEC NAA+PROBE: NEGATIVE
SARS-COV-2 RNA RESP QL NAA+PROBE: NOT DETECTED
SODIUM SERPL-SCNC: 138 MMOL/L (ref 136–145)
WBC # BLD AUTO: 12.5 X10(3) UL (ref 4–11)

## 2023-02-17 PROCEDURE — 71046 X-RAY EXAM CHEST 2 VIEWS: CPT | Performed by: EMERGENCY MEDICINE

## 2023-02-17 PROCEDURE — 99285 EMERGENCY DEPT VISIT HI MDM: CPT

## 2023-02-17 PROCEDURE — 93010 ELECTROCARDIOGRAM REPORT: CPT

## 2023-02-17 PROCEDURE — 81025 URINE PREGNANCY TEST: CPT

## 2023-02-17 PROCEDURE — 0241U SARS-COV-2/FLU A AND B/RSV BY PCR (GENEXPERT): CPT | Performed by: EMERGENCY MEDICINE

## 2023-02-17 PROCEDURE — 93005 ELECTROCARDIOGRAM TRACING: CPT

## 2023-02-17 PROCEDURE — 80053 COMPREHEN METABOLIC PANEL: CPT | Performed by: EMERGENCY MEDICINE

## 2023-02-17 PROCEDURE — 87086 URINE CULTURE/COLONY COUNT: CPT | Performed by: EMERGENCY MEDICINE

## 2023-02-17 PROCEDURE — 85379 FIBRIN DEGRADATION QUANT: CPT | Performed by: EMERGENCY MEDICINE

## 2023-02-17 PROCEDURE — 0241U SARS-COV-2/FLU A AND B/RSV BY PCR (GENEXPERT): CPT

## 2023-02-17 PROCEDURE — 96374 THER/PROPH/DIAG INJ IV PUSH: CPT

## 2023-02-17 PROCEDURE — 81001 URINALYSIS AUTO W/SCOPE: CPT | Performed by: EMERGENCY MEDICINE

## 2023-02-17 PROCEDURE — 85025 COMPLETE CBC W/AUTO DIFF WBC: CPT | Performed by: EMERGENCY MEDICINE

## 2023-02-17 RX ORDER — KETOROLAC TROMETHAMINE 15 MG/ML
15 INJECTION, SOLUTION INTRAMUSCULAR; INTRAVENOUS ONCE
Status: COMPLETED | OUTPATIENT
Start: 2023-02-17 | End: 2023-02-17

## 2023-02-18 ENCOUNTER — APPOINTMENT (OUTPATIENT)
Dept: CT IMAGING | Facility: HOSPITAL | Age: 43
End: 2023-02-18
Attending: EMERGENCY MEDICINE
Payer: MEDICAID

## 2023-02-18 VITALS
WEIGHT: 240 LBS | HEART RATE: 82 BPM | RESPIRATION RATE: 20 BRPM | SYSTOLIC BLOOD PRESSURE: 113 MMHG | DIASTOLIC BLOOD PRESSURE: 65 MMHG | TEMPERATURE: 97 F | HEIGHT: 67 IN | BODY MASS INDEX: 37.67 KG/M2 | OXYGEN SATURATION: 99 %

## 2023-02-18 LAB
ATRIAL RATE: 96 BPM
B-HCG UR QL: NEGATIVE
BILIRUB UR QL STRIP.AUTO: NEGATIVE
CLARITY UR REFRACT.AUTO: CLEAR
COLOR UR AUTO: YELLOW
GLUCOSE UR STRIP.AUTO-MCNC: NEGATIVE MG/DL
KETONES UR STRIP.AUTO-MCNC: NEGATIVE MG/DL
NITRITE UR QL STRIP.AUTO: NEGATIVE
P AXIS: 46 DEGREES
P-R INTERVAL: 142 MS
PH UR STRIP.AUTO: 7 [PH] (ref 5–8)
PROT UR STRIP.AUTO-MCNC: NEGATIVE MG/DL
Q-T INTERVAL: 326 MS
QRS DURATION: 88 MS
QTC CALCULATION (BEZET): 411 MS
R AXIS: -31 DEGREES
RBC UR QL AUTO: NEGATIVE
SP GR UR STRIP.AUTO: 1.02 (ref 1–1.03)
T AXIS: 43 DEGREES
UROBILINOGEN UR STRIP.AUTO-MCNC: 2 MG/DL
VENTRICULAR RATE: 96 BPM

## 2023-02-18 PROCEDURE — 74177 CT ABD & PELVIS W/CONTRAST: CPT | Performed by: EMERGENCY MEDICINE

## 2023-02-18 RX ORDER — ORPHENADRINE CITRATE 100 MG/1
100 TABLET, EXTENDED RELEASE ORAL 2 TIMES DAILY PRN
Qty: 20 TABLET | Refills: 0 | Status: SHIPPED | OUTPATIENT
Start: 2023-02-18

## 2023-02-18 RX ORDER — LIDOCAINE 50 MG/G
1 PATCH TOPICAL EVERY 24 HOURS
Qty: 30 PATCH | Refills: 0 | Status: SHIPPED | OUTPATIENT
Start: 2023-02-18

## 2023-02-18 RX ORDER — IBUPROFEN 600 MG/1
600 TABLET ORAL EVERY 8 HOURS PRN
Qty: 30 TABLET | Refills: 0 | Status: SHIPPED | OUTPATIENT
Start: 2023-02-18

## 2023-02-18 NOTE — ED QUICK NOTES
Patient states she developed a sore throat and cough this AM. No fever. Pain in right upper back, near shoulder blade. Started this evening. Worse when taking deep breath, coughing, moving. No shortness of breath. No respiratory distress.

## 2023-02-18 NOTE — ED INITIAL ASSESSMENT (HPI)
Here for R upper back pain with radiating to anterior chest. Pain with deep breathing & movement.  Also with rectal bleeding for past few days

## 2023-09-11 NOTE — TELEPHONE ENCOUNTER
[facility]  [place] INPATIENT ENCOUNTER  PHYSICAL MEDICINE AND REHABILITATION  DAILY PROGRESS NOTE    ADMISSION DATE:  8/29/2023  DATE:  9/11/2023  CURRENT HOSPITAL DAY:  Hospital Day: 14  ATTENDING PHYSICIAN:  Natan Madison MD  CODE STATUS:  Full Resuscitation    CHIEF COMPLAINT:  Non-traumatic intracranial hemorrhage (CMD)    INTERVAL HISTORY:    Karin Shaw is a 73 year old female patient admitted with Cerebral hemorrhage (CMD) [I61.9]     HPI: Chart was reviewed.  HPI was discussed with nursing staff.  No new neurological changes are reported.  Patient reports feeling well. She is sad her visit with her siblings was short as they all live \"all over\". Per therapist that did the family training over the weekend. Family uncertain of discharge plan. They dont have 24 hour supervision. 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      MEDICATIONS:    The medication list was reviewed today.   • polyethylene glycol (MIRALAX) packet 17 g Oral Daily   • sodium chloride (PF) 0.9 % injection 2 mL Intracatheter 2 times per day   • heparin (porcine) injection 5,000 Units Subcutaneous 3 times per day         • dextrose 50 % injection 25 g  25 g Intravenous PRN   • dextrose 50 % injection 12.5 g  12.5 g Intravenous PRN   • glucagon (GLUCAGEN) injection 1 mg  1 mg Intramuscular PRN   • dextrose (GLUTOSE) 40 % gel 15 g  15 g Oral PRN   • acetaminophen (TYLENOL) tablet 650 mg  650 mg Oral Q4H PRN    Or   • acetaminophen (TYLENOL) 160 MG/5ML solution 650 mg  650 mg Per NG Tube Q4H PRN    Or   • acetaminophen (TYLENOL) suppository 650 mg  650 mg Rectal Q4H PRN   • sodium chloride 0.9 % flush bag 25 mL  25 mL Intravenous PRN   • sodium chloride 0.9 % flush bag 25 mL  25 mL Intravenous PRN   • ondansetron (ZOFRAN ODT) disintegrating tablet 4 mg  4 mg Oral Q12H PRN    Or   • ondansetron (ZOFRAN) injection 4 mg  4 mg Intravenous Q12H PRN   • polyethylene glycol (MIRALAX)  Medication: Norco    Date of last refill: 1/22/2019  [place] 1/42/6382    Last office visit: 1/22/2019  Due back to clinic per last office note:  8 weeks  Date next office visit scheduled:    Future Appointments   Date T packet 17 g  17 g Oral Daily PRN   • docusate sodium-sennosides (SENOKOT S) 50-8.6 MG 2 tablet  2 tablet Oral BID PRN   • bisacodyl (DULCOLAX) suppository 10 mg  10 mg Rectal Daily PRN   • magnesium hydroxide (MILK OF MAGNESIA) 400 MG/5ML suspension 30 mL  30 mL Oral Daily PRN   • hydrALAZINE (APRESOLINE) injection 10 mg  10 mg Intravenous Q4H PRN   • OLANZapine (ZyPREXA ZYDIS) disintegrating tablet 2.5 mg  2.5 mg Oral Q6H PRN   • docusate sodium (ENEMEEZ MINI) 283 MG rectal enema 283 mg  283 mg Rectal Daily PRN   • melatonin tablet 3 mg  3 mg Oral Nightly PRN         REVIEW OF SYSTEMS:  Review of Systems   Unable to perform ROS: Medical condition   Constitutional: Negative for chills and fever.   Respiratory: Negative for cough and shortness of breath.    Cardiovascular: Negative for chest pain.   Gastrointestinal: Negative for abdominal pain, constipation, diarrhea, nausea and vomiting.   Neurological: Positive for weakness. Negative for headaches.   Psychiatric/Behavioral: The patient does not have insomnia.        OBJECTIVE:    VITAL SIGNS:     Vital Last Value 24 Hour Range   Temperature 97.7 °F (36.5 °C) (09/11/23 0815) Temp  Min: 97.7 °F (36.5 °C)  Max: 97.9 °F (36.6 °C)   Pulse 74 (09/11/23 0815) Pulse  Min: 72  Max: 83   Respiratory 14 (09/11/23 0815) Resp  Min: 14  Max: 16   Non-Invasive  Blood Pressure 114/74 (09/11/23 0815) BP  Min: 100/70  Max: 114/74   Pulse Oximetry 99 % (09/11/23 0617) SpO2  Min: 96 %  Max: 99 %     Vital Today Admitted   Weight 43 kg (94 lb 12.8 oz) (09/11/23 0514) Weight: 45.5 kg (100 lb 5 oz) (08/29/23 1600)       INTAKE/OUTPUT:    No intake or output data in the 24 hours ending 09/11/23 1444    Bowel: Stool Amount: Large (09/11/23 0300)  Stool Occurrence: 1 (09/11/23 0300)     Bladder PVR: Bladder Scan  Reason for Scan: per order;Post void evaluation (08/29/23 2223)  Bladder Scanned Volume (mL): 25 mL (08/29/23 2223)    PHYSICAL EXAMINATION:  Physical Exam  Vitals and nursing  note reviewed.   Constitutional:       General: She is not in acute distress.     Appearance: Normal appearance. She is not ill-appearing.   HENT:      Head: Normocephalic and atraumatic.      Mouth/Throat:      Mouth: Mucous membranes are moist.   Eyes:      Extraocular Movements: Extraocular movements intact.   Cardiovascular:      Rate and Rhythm: Normal rate.   Pulmonary:      Effort: Pulmonary effort is normal. No respiratory distress.   Abdominal:      General: Abdomen is flat. There is no distension.      Palpations: Abdomen is soft.   Musculoskeletal:         General: Normal range of motion.      Right lower leg: No edema.      Left lower leg: No edema.   Skin:     General: Skin is warm and dry.   Neurological:      Mental Status: She is alert. She is disoriented.      Cranial Nerves: Cranial nerve deficit present.      Motor: Weakness present.      Gait: Gait abnormal.   Psychiatric:         Mood and Affect: Mood normal.         Current Functional Status:     Mobility (since 9/9/2023)     sit to stand  contact guard/touching/steadying assist    stand to sit  contact guard/touching/steadying assist    stand pivot  contact guard/touching/steadying assist; minimal assist    transfers assistive devices  hand hold; gait belt    gait assistance  minimal assist; contact guard/touching/steadying assist    1st trial  400    2nd trial  350    3rd trial  500    3rd trial  500    gait surface  even    number of stairs, trial 1  4    stair assistance  minimal assist; contact guard/touching/steadying assist    stair rails  left rail only; 2 hands on one rail    stair pattern  step to          Self Care/ADL (since 9/9/2023)     None          Communication/Cognition (since 9/9/2023)     None            LABORATORY DATA:    Recent Labs   Lab 09/05/23  1127   WBC 9.4   RBC 4.25   HGB 12.6   HCT 39.2   MCV 92.2   MCH 29.6   MCHC 32.1   RDWCV 13.4   *       No results found    Invalid input(s): \"AGAP\", \"FST1\"    No  results found    No results found    IMAGING STUDIES:   No orders to display         ASSESSMENT/PLAN:     1. Left hemiparesis   2. Right ICH   3. S/p craniotomy with hematoma evacuation   4. Dysphagia  5. Dysarthria   6. Cognitive impairment   7. Impaired gait/mobility   8. Fall risk   9. Agitation   10. Leukocytosis   11. Malnutrition - severe     IMPAIRMENT GROUP CODE: 1.1    Left hemiparesis -improved  Right ICH   - S/p craniotomy with hematoma evacuation  - PT/OT to increase mobility, coordination, balance, endurance, self care   - Provigil to improve alertness-Now off      Dysarthria   Dysphagia  - Admitted to 6s on IDDSI 5/ 2  - SLP for eval and upgrade as appropriate      Agitation    - Zyprexa as needed -last use 8/31     ID: monitor for signs/symptoms of infection      CARD: Monitor cardiac status with increased activity      PULM: Monitor respiratory status with increased activity      HEME: intermittent monitoring       GI:  on bowel program.     :  on bladder program.     SKIN: Nursing to monitor for any issues with skin integrity.     VASC: Continue heparin at prophylactic dosage.     ELECTROLYTES:  Will monitor and make adjustments as needed.      NUTRITION:     DISPOSITION: Tentative discharge date 9/19/23        TIME SPENT:    I spent 50 minutes on this case with counseling and coordination care >50% including multi-disciplinary team conference; discharge plans discussed with patient, , RN/Physical therapy/Occupational therapy

## 2024-07-26 ENCOUNTER — OFFICE VISIT (OUTPATIENT)
Dept: PODIATRY CLINIC | Facility: CLINIC | Age: 44
End: 2024-07-26

## 2024-07-26 DIAGNOSIS — M76.62 ACHILLES TENDINITIS OF BOTH LOWER EXTREMITIES: ICD-10-CM

## 2024-07-26 DIAGNOSIS — M76.61 ACHILLES TENDINITIS OF BOTH LOWER EXTREMITIES: ICD-10-CM

## 2024-07-26 DIAGNOSIS — M72.2 PLANTAR FASCIITIS: Primary | ICD-10-CM

## 2024-07-26 PROCEDURE — 99204 OFFICE O/P NEW MOD 45 MIN: CPT | Performed by: STUDENT IN AN ORGANIZED HEALTH CARE EDUCATION/TRAINING PROGRAM

## 2024-07-26 RX ORDER — MELOXICAM 15 MG/1
15 TABLET ORAL
Qty: 30 TABLET | Refills: 1 | Status: SHIPPED | OUTPATIENT
Start: 2024-07-26

## 2024-07-26 NOTE — PROGRESS NOTES
[facility]  Progress Note      Julissa Green is a 44 year old female.   Chief Complaint   Patient presents with    Foot Pain     Consult - B/l  foot , L foot is worse today- Pt states pain and burning sensation when walking. Pt states pain in heels and achilles. Rates pain 7/10 when walking. Pain worse after sitting for a while. Onset for about 3/10        HPI:     Patient is a pleasant 44-year-old female presents to clinic with bilateral lower extremity pain.  Admits to pain on the plantar aspect of both heels and Achilles tendon insertion.  Rates pain a 7 out of 10 when walking.  Patient does relate to walking barefoot when at home.  Denies any pedal injuries or trauma.  Pain with post static dyskinesia.  Admits to taking NSAIDs as needed for pain    Allergies: Patient has no known allergies.    Current Outpatient Medications   Medication Sig Dispense Refill    Meloxicam 15 MG Oral Tab Take 1 tablet (15 mg total) by mouth daily with dinner. 30 tablet 1    orphenadrine  MG Oral Tablet 12 Hr Take 100 mg by mouth 2 (two) times daily as needed (muscle spasm). 20 tablet 0    ibuprofen 600 MG Oral Tab Take 1 tablet (600 mg total) by mouth every 8 (eight) hours as needed for Pain or Fever. 30 tablet 0    lidocaine 5 % External Patch Place 1 patch onto the skin daily. 30 patch 0    lidocaine 5 % External Patch APPLY 1 PATCH TOPICALLY RIGHT LOWER BACK DAILY AS DIRECTED        Past Medical History:    Anemia    Dysmenorrhea    Fibroids    H/O low back pain    Pinched nerve in neck    Sciatica      Past Surgical History:   Procedure Laterality Date      10/22/2015    x 1    Other surgical history      back injection      Family History   Problem Relation Age of Onset    Cancer Father     Cancer Paternal Grandmother     Cancer Paternal Aunt     Cancer Paternal Uncle     Other (COPD) Mother     Heart Disease Neg     Stroke Neg       Social History     Socioeconomic History    Marital  status: Unknown   Tobacco Use    Smoking status: Never    Smokeless tobacco: Never   Vaping Use    Vaping status: Never Used   Substance and Sexual Activity    Alcohol use: Not Currently     Alcohol/week: 1.0 standard drink of alcohol     Types: 1 Standard drinks or equivalent per week     Comment: occ    Drug use: Yes     Types: Cannabis     Comment: cooks with it and samples her cooking    Sexual activity: Yes     Partners: Male     Comment: no contraception   Other Topics Concern    Caffeine Concern No     Comment: soda periodically, occasional frappeccino    Exercise No     Comment: Not currently tolerated           REVIEW OF SYSTEMS:     Denies nause, fever, chills  No calf pain  Denies chest pain or SOB      EXAM:   There were no vitals taken for this visit.  GENERAL: well developed, well nourished, in no apparent distress  EXTREMITIES:   1. Integument: Normal skin temperature and turgor  2. Vascular: Dorsalis pedis two out of four bilateral and posterior tibial pulses two out of   four bilateral, capillary refill normal.   3. Musculoskeletal: All muscle groups are graded 5 out of 5 in the foot and ankle.  Pain with palpation to bilateral medial calcaneal tubercles and to Achilles tendon insertion.  There   4. Neurological: Normal sharp dull sensation; reflexes normal.             ASSESSMENT AND PLAN:   Diagnoses and all orders for this visit:    Plantar fasciitis  -     Physical Therapy Referral - [facility]  -     DME - External  -     XR FOOT, COMPLETE (MIN 3 VIEWS), LEFT (CPT=73630); Future  -     XR FOOT, COMPLETE (MIN 3 VIEWS), RIGHT (CPT=73630); Future    Achilles tendinitis of both lower extremities  -     Physical Therapy Referral - [facility]  -     DME - External  -     XR FOOT, COMPLETE (MIN 3 VIEWS), LEFT (CPT=73630); Future  -     XR FOOT, COMPLETE (MIN 3 VIEWS), RIGHT (CPT=73630); Future    Other orders  -     Meloxicam 15 MG Oral Tab; Take 1 tablet (15 mg total) by mouth daily  with dinner.        Plan:     Patient seen and examined and findings discussed with patient.  We discussed with patient on the importance of supportive shoes and avoid walking barefoot.  Referral placed for custom orthotics.  Referral placed for physical therapy.  Discussed with patient the importance of daily calf stretching to avoid any possible Achilles tendon rupture or need for surgical intervention.  Discussed the importance of custom orthotics that are needed to produce arch support.  Prescription for oral meloxicam to be taken as needed.  Return to clinic once physical therapy sessions have been completed.  Will place order for bilateral foot x-rays to be obtained prior to next appointment.    The patient indicates understanding of these issues and agrees to the plan.        Natalya Lu DPM

## 2025-06-19 NOTE — PROGRESS NOTES
2025       Brigida Fuentes MD  [address] [facility] [address]  Via In Basket      Patient: Lauren Kaplan   YOB: 1953   Date of Visit: 2025       Dear Dr. Fuentes:    Thank you for referring Lauren Kaplan to me for evaluation. Below are my notes for this visit with her.    If you have questions, please do not hesitate to call me. I look forward to following your patient along with you.      Sincerely,        Glen Leone MD        CC: No Recipients  Glen Leone MD  2025  1:28 PM  Signed  [facility]    Referring: Brigida Fuentes MD  Chief complaint: CAD, CP, palpitations    HPI: Lauren Kaplan is a 71 year old female with non-obstructive CAD, HTN, and HLD who presents for follow-up of dyspnea, chest tightness, and palpitations.    24  She was previously followed by Dr. Angel. No exercise, but does [employer] lessons a couple times a month. Every now and then she experiences chest pain. Sometimes it feels like someone is stepping on her chest and it is severe; the last time this happens was a few months ago. Sometimes it is milder. No associatoin with exertion, but often with strong emotion. Sometimes it lasts a few minutes, other times all day. Sometimes she feels SOB at the same time. Occasional left arm, which is distinct from the chest pain. Rare nocturnal symptoms. She tries very hard to eat right. SH: dietician manager, remote smoking, occasional alcohol. FH: mother with mitral stenosis who refused surgery and  at age 68; sister with prosthetic mitral valve, now  of unclear causes. Occasionally she has palpitations, where her heart races. It can last for half a day. The last time was a couple months ago. Trial of SL NTG prn. KardiaMobile. LDL 78, increase atorva 20 to 40 mg daily.     9/10/24  Had diffuse chest tightness lasting a few minutes x2 in July in setting of COVID infection. Went to urgent care. Did not take nitro. No  Noted.  Thx. chest pain since then. Re palps: gets a very fast heart beat every now and then, lasting a few minutes or sometimes longer, several times over last few weeks. KardiaMobile shows normal sinus rhythm. Mild RAMOS on walking back and forth to her [place]. No exercise currently.     12/19/24  When stressed at work or with grandkids she feels mild dyspnea, occasionally chest tightness, occ palps, lasting for a few minutes. If the stress does not go away, it last for longer. No exercise, though the few times she has done so she did have these symptoms. Whenever she feels heart racing, it always shows normal sinus rhythm on her Kardia Mobile. She is retiring soon.     6/19/25  Had been walking regularly in May and did a lot traveling and felt well.To ED late May for LH/presyncope with walking, worse with turning head, attributed to BPPV. Rx successfully in ED w/ vestibular therapy. No CP since last week: Friday night felt fast heartbeat, fell asleep and woke up; she did not check Kardia as it was Shabbas. Hasn't happened again. Prior episodes on Kardia have showed normal sinus rhythm. Some dull chest pain that day and an episode of pulsating left arm, not associated with racing heart. Wants to start walking outside but afraid due to recent vertigo.       Past Medical History:   Diagnosis Date   • Abnormal nuclear stress test 11/21/2022   • Abscess of buttock 01/28/2020   • Atypical chest pain 04/06/2021   • Coronary artery disease    • Cough 02/18/2020    Fever and body aches for 2 weeks and saw a doctor on Sunday 1 week ago and was pos for strep and was given a penicillin shot and she felt sl better and the fever and aches went away and went back to the doctor on Wednesday and she said she might have a virus ---then this past Friday  she didn't feel great and took coricidin andn today dayquil and left work ----her throat is sore from coughing --al   • Dizziness 04/01/2016   • Essential (primary) hypertension    • Fibroid     • Gestational diabetes (CMD)    • H/O bone density study 2014   • H/O cardiovascular stress test 07/10/2012   • H/O colonoscopy 2016   • H/O complete eye exam 2017   • H/O mammogram 2022   • Hx of ovarian cyst 2018    right   • Idiopathic hypercalciuria    • Kidney stones    • Nocturia    • Osteopenia    • Postmenopausal atrophic vaginitis    • Rash 2021   • Vertigo    • Viral gastroenteritis 2020       Past Surgical History:   Procedure Laterality Date   • Bunionectomy Right    • Cath place for coronary angiography w md sup - interp graft & rt heart     • Cervical polypectomy     •  delivery only     • Colonoscopy  2016, 10 years   • Cystoscopy     • Dilation and curettage of uterus     • Extracorporeal shock wave lithotripsy     • Hysteroscopy     • Rectal polypectomy     • Ureter surgery         Current Outpatient Medications   Medication Sig   • Ruxolitinib Phosphate (Opzelura) 1.5 % Cream Apply 1.5 % topically.   • meclizine (ANTIVERT) 25 MG tablet Take 1 tablet by mouth 3 times daily as needed for Dizziness.   • triamcinolone (ARISTOCORT) 0.1 % cream Apply 1 Application topically in the morning and 1 Application in the evening.   • potassium CHLORIDE (KLOR-CON M) 20 MEQ be ER tablet Take 1 tablet by mouth in the morning and 1 tablet in the evening.   • hydroCHLOROthiazide 25 MG tablet Take 1 tablet by mouth 2 times daily.   • atorvastatin (LIPITOR) 40 MG tablet Take 1 tablet by mouth daily.   • nitroGLYcerin (NITROSTAT) 0.3 MG sublingual tablet Place 1 tablet under the tongue every 5 minutes as needed for Chest pain.   • cyanocobalamin (VITAMIN B-12) 500 MCG tablet Take 1 tablet by mouth every other day.    • Cholecalciferol (VITAMIN D3) 1000 units capsule Take 3 tablets by mouth daily.   • aspirin 81 MG tablet Take 81 mg by mouth daily.     No current facility-administered medications for this visit.       ALLERGIES:   Allergen Reactions   •  Bactrim Ds ANAPHYLAXIS   • Sulfamethoxazole-Trimethoprim ANAPHYLAXIS     Other reaction(s): Other   • Sulfa Antibiotics Other (See Comments)     Unknown    Other reaction(s): WEAKNESS  Unknown   • Sulfadiazine WEAKNESS       Family History   Problem Relation Age of Onset   • Stroke Mother    • Hypertension Mother    • Urolithiasis Mother    • Hypertension Father    • Heart Sister         Mitral valve prolapse   • Other Sister         GERD   • Hypertension Sister    • Cancer, Breast Sister    • Cancer, Endometrial Sister    • Patient is unaware of any medical problems Daughter    • Patient is unaware of any medical problems Daughter    • Patient is unaware of any medical problems Son        Social History     Tobacco Use   • Smoking status: Former     Current packs/day: 0.00     Average packs/day: 0.3 packs/day for 1.2 years (0.3 ttl pk-yrs)     Types: Cigarettes     Start date: 1984     Quit date: 1985     Years since quittin.4     Passive exposure: Past   • Smokeless tobacco: Never   • Tobacco comments:     I did social smoking for about a year.  Never liked it.   Vaping Use   • Vaping status: never used   Substance Use Topics   • Alcohol use: Yes     Comment: Sometimes with friends on the [employer] [place] but not regularly.   • Drug use: Never       ROS: as per HPI    /68 (Patient Position: Standing)   Pulse 77   Wt 55.4 kg (122 lb 2.2 oz)   LMP  (LMP Unknown)   BMI 23.85 kg/m²   BSA 1.51 m²     Gen: the patient is comfortable and no distress  HEENT: anicteric sclera, moist mucus membranes  Cor: normal rate and regular rhythm, normal S1 and S2, no murmurs/rubs/gallops  Vasc: no carotid bruits,   Lungs: clear to auscultation bilaterally  Abd: soft, nontender, nondistended  Ext: no edema, warm  Skin: warm and dry  Neuro: alert and appropriate  Psych: appropriate affect, normal judgment and insight    Lab Results   Component Value Date    SODIUM 140 05/15/2025    POTASSIUM 3.7 05/15/2025    CHLORIDE  101 05/15/2025    CO2 28 05/15/2025    BUN 15 05/15/2025    CREATININE 0.60 05/15/2025    GLUCOSE 165 (H) 05/15/2025       Lab Results   Component Value Date    WBC 5.8 05/15/2025     05/15/2025       Lab Results   Component Value Date    CHOLESTEROL 182 03/13/2025    TRIGLYCERIDE 56 03/13/2025     03/13/2025    CALCLDL 63 03/13/2025    GPT 50 05/15/2025    AST 48 (H) 05/15/2025       Lab Results   Component Value Date    TSH 0.496 03/13/2025        Testing:  Personally reviewed and independently interpreted:  ECG 5/15/25: nsr, nonspecific ST abnl incr from prior  ECG 7/15/24: nsr  ECG 5/9/24: nsr    Cath 2/23: LM wnl, LAD mid 40% [my imp: 50%], OM 50% w/ iFR 0.98, RCA dominant and large w/ 30% prox, LVEDP 12 mmHg    Holter x8d 8/16/23: sinus avg 80, <0.01% PACs and PVCs, no arrhythmias, 5 events w/ 4 a/w artifact and 1 w/ nsr    Report reviewed:  Cor CTA 1/30/23:  (83rd %), mod-sev CAD w/ findings c/f sig CAD in LAD and Cx, borderline RCA  NST 2022: normal perfusion    Assessment and Plan:    Dyspnea  - with emotional stress only  - none with walking, ctm    Chest tightness  - intermittent episodes that tend to be brought on by strong emotion, none recently  - intermediate disease on cath Feb 2023 and no ischemia on nuclear stress 2022  - none with walking, ctm    Palpitations  - rare episodes, sometimes sustained, of a racing heart  - most episodes on monitor in 2023 associated with non-diagnostic artifact, and did not have a major episode while wearing  - Ginx lydia tracings, personally reviewed, show sinus rhythm and sinus w/ baseline wander    Non-obstructive CAD  - 50% OM (iFR negative), 40-50% mid LAD, 30% prox RCA, LVEDP 12 mmHg - cath Feb 2023  - ASA 81 mg daily   - atorvastatin 40 mg daily, LDL 63 Mar 2025    HTN  - well controlled  - hctz 25 mg BID + Kcl 20 mEq BID    Follow up in 6 mo    Thank you for allowing me to take part in the care of your patient. Please do not hesitate  to contact me with questions or concerns.     Glen Leone MD, FACC  Interventional and General Cardiology

## (undated) DEVICE — NEEDLE SPINAL 22X7 405149

## (undated) DEVICE — NEEDLE SPINAL 22X5 405148

## (undated) DEVICE — PAIN TRAY: Brand: MEDLINE INDUSTRIES, INC.

## (undated) DEVICE — GLOVE SURG SENSICARE SZ 7-1/2

## (undated) DEVICE — GLOVE SURG SENSICARE SZ 6-1/2

## (undated) DEVICE — BANDAID COVERLET 1X3

## (undated) DEVICE — GLOVE SURG SENSICARE SZ 6

## (undated) DEVICE — REMOVER DURAPREP 3M

## (undated) DEVICE — PREP DURA SM 3M 8635

## (undated) NOTE — LETTER
Dawna Flores [address] [address]  [facility], [address] [facility]    Consent for Operation  Date: __________________                                Time: _______________    1.  I authorize the performance upon [facility] the following operation:  P revealed by the pictures or by descriptive texts accompanying them. If the procedure has been videotaped, the surgeon will obtain the original videotape. The hospital will not be responsible for storage or maintenance of this tape.     6. For the purpose of THAT MY DOCTOR PROVIDED ME WITH THE ABOVE EXPLANATIONS, THAT ALL BLANKS OR STATEMENTS REQUIRING INSERTION OR COMPLETION WERE FILLED IN.     Signature of Patient:   ___________________________    When the patient is a minor or mentally incompetent to give co

## (undated) NOTE — Clinical Note
Could you contact pt and let her know that I ordered a pelvic us to evaluate the fibroid they saw on the xray she had done recently. Thank you!

## (undated) NOTE — MR AVS SNAPSHOT
After Visit Summary   6/15/2021    Anais Sanders    MRN: [id number]           Visit Information     Date & Time  6/15/2021  3:00 PM Provider  Marylen Stamp, MD [address], [place] , [facility] Dept.  Phone  [phone].  The cost for a Video Visit is currently $35.         If you receive a survey from [facility], please take a few minutes to complete it and provide feedback.  We strive to deliver the best patient experience and are looking for ways to make improvemen Average cost  $120*     EMERGENCY ROOM Life-threatening emergencies needing immediate intervention at a hospital emergency room.  Average cost  $2,300*   *Cost varies based on your insurance coverage  For more information about hours, locations or appointme

## (undated) NOTE — LETTER
10/30/18          Razia Cisneros  :  1980      To Whom It May Concern: This patient was seen in our office on 10/30/18 . Work status:  Part time 4 hours per day 5 days per week until reeval. Sedentary work only.   May return to work stat

## (undated) NOTE — LETTER
19          Julissa Turk  :  1980      To Whom It May Concern: This patient was seen in our office on 19 . Work status:   limited duty max 4 hours/day 5 days/week. max standing 30 minutes or total 2 hours.   Max walking 15

## (undated) NOTE — LETTER
09/10/18          Julissa Cadet  :  1980      To Whom It May Concern: This patient was seen in our office on 09/10/18 . Work status:  Off beginning 18 until further notice.     If this office may be of further assistance, please do